# Patient Record
Sex: MALE | Race: OTHER
[De-identification: names, ages, dates, MRNs, and addresses within clinical notes are randomized per-mention and may not be internally consistent; named-entity substitution may affect disease eponyms.]

---

## 2019-08-07 ENCOUNTER — HOSPITAL ENCOUNTER (EMERGENCY)
Dept: HOSPITAL 94 - ER | Age: 84
Discharge: HOME | End: 2019-08-07
Payer: MEDICARE

## 2019-08-07 VITALS — SYSTOLIC BLOOD PRESSURE: 162 MMHG | DIASTOLIC BLOOD PRESSURE: 70 MMHG

## 2019-08-07 VITALS — HEIGHT: 65 IN | WEIGHT: 150.31 LBS | BODY MASS INDEX: 25.04 KG/M2

## 2019-08-07 DIAGNOSIS — Z79.899: ICD-10-CM

## 2019-08-07 DIAGNOSIS — Z79.4: ICD-10-CM

## 2019-08-07 DIAGNOSIS — I25.10: ICD-10-CM

## 2019-08-07 DIAGNOSIS — Z95.1: ICD-10-CM

## 2019-08-07 DIAGNOSIS — G89.29: ICD-10-CM

## 2019-08-07 DIAGNOSIS — I25.2: ICD-10-CM

## 2019-08-07 DIAGNOSIS — I10: ICD-10-CM

## 2019-08-07 DIAGNOSIS — R42: Primary | ICD-10-CM

## 2019-08-07 DIAGNOSIS — E78.00: ICD-10-CM

## 2019-08-07 DIAGNOSIS — Z91.018: ICD-10-CM

## 2019-08-07 LAB
ALBUMIN SERPL BCP-MCNC: 3.2 G/DL (ref 3.4–5)
ALBUMIN/GLOB SERPL: 0.8 {RATIO} (ref 1.1–1.5)
ALP SERPL-CCNC: 97 IU/L (ref 46–116)
ALT SERPL W P-5'-P-CCNC: 25 U/L (ref 12–78)
ANION GAP SERPL CALCULATED.3IONS-SCNC: 5 MMOL/L (ref 8–16)
AST SERPL W P-5'-P-CCNC: 14 U/L (ref 10–37)
BASOPHILS # BLD AUTO: 0 X10'3 (ref 0–0.2)
BASOPHILS NFR BLD AUTO: 0.5 % (ref 0–1)
BILIRUB SERPL-MCNC: 0.4 MG/DL (ref 0.1–1)
BUN SERPL-MCNC: 18 MG/DL (ref 7–18)
BUN/CREAT SERPL: 20.7 (ref 5.4–32)
CALCIUM SERPL-MCNC: 9 MG/DL (ref 8.5–10.1)
CHLORIDE SERPL-SCNC: 99 MMOL/L (ref 99–107)
CO2 SERPL-SCNC: 28.7 MMOL/L (ref 24–32)
CREAT SERPL-MCNC: 0.87 MG/DL (ref 0.6–1.1)
EOSINOPHIL # BLD AUTO: 0.2 X10'3 (ref 0–0.9)
EOSINOPHIL NFR BLD AUTO: 2.6 % (ref 0–6)
ERYTHROCYTE [DISTWIDTH] IN BLOOD BY AUTOMATED COUNT: 14.1 % (ref 11.5–14.5)
GFR SERPL CREATININE-BSD FRML MDRD: 84 ML/MIN
GLUCOSE SERPL-MCNC: 211 MG/DL (ref 70–104)
HCT VFR BLD AUTO: 38.2 % (ref 42–52)
HGB BLD-MCNC: 12.5 G/DL (ref 14–17.9)
LYMPHOCYTES # BLD AUTO: 1.5 X10'3 (ref 1.1–4.8)
LYMPHOCYTES NFR BLD AUTO: 19.9 % (ref 21–51)
MCH RBC QN AUTO: 28.6 PG (ref 27–31)
MCHC RBC AUTO-ENTMCNC: 32.8 G/DL (ref 33–36.5)
MCV RBC AUTO: 87.4 FL (ref 78–98)
MONOCYTES # BLD AUTO: 0.9 X10'3 (ref 0–0.9)
MONOCYTES NFR BLD AUTO: 12 % (ref 2–12)
NEUTROPHILS # BLD AUTO: 4.9 X10'3 (ref 1.8–7.7)
NEUTROPHILS NFR BLD AUTO: 65 % (ref 42–75)
PLATELET # BLD AUTO: 217 X10'3 (ref 140–440)
PMV BLD AUTO: 8.6 FL (ref 7.4–10.4)
POTASSIUM SERPL-SCNC: 4.3 MMOL/L (ref 3.5–5.1)
PROT SERPL-MCNC: 7 G/DL (ref 6.4–8.2)
RBC # BLD AUTO: 4.38 X10'6 (ref 4.7–6.1)
SODIUM SERPL-SCNC: 133 MMOL/L (ref 135–145)
WBC # BLD AUTO: 7.5 X10'3 (ref 4.5–11)

## 2019-08-07 PROCEDURE — 84484 ASSAY OF TROPONIN QUANT: CPT

## 2019-08-07 PROCEDURE — 85025 COMPLETE CBC W/AUTO DIFF WBC: CPT

## 2019-08-07 PROCEDURE — 85610 PROTHROMBIN TIME: CPT

## 2019-08-07 PROCEDURE — 36415 COLL VENOUS BLD VENIPUNCTURE: CPT

## 2019-08-07 PROCEDURE — 71045 X-RAY EXAM CHEST 1 VIEW: CPT

## 2019-08-07 PROCEDURE — 99284 EMERGENCY DEPT VISIT MOD MDM: CPT

## 2019-08-07 PROCEDURE — 80053 COMPREHEN METABOLIC PANEL: CPT

## 2019-08-07 PROCEDURE — 96360 HYDRATION IV INFUSION INIT: CPT

## 2019-08-07 PROCEDURE — 93005 ELECTROCARDIOGRAM TRACING: CPT

## 2019-08-07 PROCEDURE — 83880 ASSAY OF NATRIURETIC PEPTIDE: CPT

## 2019-08-07 NOTE — NUR
attempted gait test on pt. Pt refused to attempt gait test. Stated he was too 
hungry and tired to try to walk. Pt stated if we gave him a snack and let him 
take a nap he would try to walk after he woke up from his nap. Provider Octavia 
notified

## 2019-09-09 ENCOUNTER — HOSPITAL ENCOUNTER (EMERGENCY)
Dept: HOSPITAL 94 - ER | Age: 84
Discharge: HOME | End: 2019-09-09
Payer: MEDICARE

## 2019-09-09 VITALS — BODY MASS INDEX: 26.71 KG/M2 | WEIGHT: 160.34 LBS | HEIGHT: 65 IN

## 2019-09-09 VITALS — SYSTOLIC BLOOD PRESSURE: 145 MMHG | DIASTOLIC BLOOD PRESSURE: 75 MMHG

## 2019-09-09 DIAGNOSIS — E78.00: ICD-10-CM

## 2019-09-09 DIAGNOSIS — E07.9: ICD-10-CM

## 2019-09-09 DIAGNOSIS — G89.29: ICD-10-CM

## 2019-09-09 DIAGNOSIS — Z79.4: ICD-10-CM

## 2019-09-09 DIAGNOSIS — R42: ICD-10-CM

## 2019-09-09 DIAGNOSIS — D72.829: Primary | ICD-10-CM

## 2019-09-09 DIAGNOSIS — I10: ICD-10-CM

## 2019-09-09 DIAGNOSIS — Z95.1: ICD-10-CM

## 2019-09-09 DIAGNOSIS — N40.0: ICD-10-CM

## 2019-09-09 DIAGNOSIS — Z91.013: ICD-10-CM

## 2019-09-09 DIAGNOSIS — E11.9: ICD-10-CM

## 2019-09-09 DIAGNOSIS — I25.10: ICD-10-CM

## 2019-09-09 DIAGNOSIS — I25.2: ICD-10-CM

## 2019-09-09 LAB
ALBUMIN SERPL BCP-MCNC: 3 G/DL (ref 3.4–5)
ALBUMIN/GLOB SERPL: 0.8 {RATIO} (ref 1.1–1.5)
ALP SERPL-CCNC: 85 IU/L (ref 46–116)
ALT SERPL W P-5'-P-CCNC: 21 U/L (ref 12–78)
ANION GAP SERPL CALCULATED.3IONS-SCNC: 7 MMOL/L (ref 8–16)
AST SERPL W P-5'-P-CCNC: 15 U/L (ref 10–37)
BACTERIA URNS QL MICRO: (no result) /HPF
BASOPHILS # BLD AUTO: 0 X10'3 (ref 0–0.2)
BASOPHILS NFR BLD AUTO: 0.2 % (ref 0–1)
BILIRUB SERPL-MCNC: 0.5 MG/DL (ref 0.1–1)
BUN SERPL-MCNC: 15 MG/DL (ref 7–18)
BUN/CREAT SERPL: 19.2 (ref 5.4–32)
CALCIUM SERPL-MCNC: 9 MG/DL (ref 8.5–10.1)
CHLORIDE SERPL-SCNC: 98 MMOL/L (ref 99–107)
CLARITY UR: CLEAR
CO2 SERPL-SCNC: 26.1 MMOL/L (ref 24–32)
COLOR UR: YELLOW
CREAT SERPL-MCNC: 0.78 MG/DL (ref 0.6–1.1)
DEPRECATED SQUAMOUS URNS QL MICRO: (no result) /LPF
EOSINOPHIL # BLD AUTO: 0 X10'3 (ref 0–0.9)
EOSINOPHIL NFR BLD AUTO: 0.2 % (ref 0–6)
ERYTHROCYTE [DISTWIDTH] IN BLOOD BY AUTOMATED COUNT: 14.2 % (ref 11.5–14.5)
GFR SERPL CREATININE-BSD FRML MDRD: > 90 ML/MIN
GLUCOSE SERPL-MCNC: 221 MG/DL (ref 70–104)
GLUCOSE UR STRIP-MCNC: 250 MG/DL
HCT VFR BLD AUTO: 35.3 % (ref 42–52)
HGB BLD-MCNC: 11.6 G/DL (ref 14–17.9)
HGB UR QL STRIP: (no result)
KETONES UR STRIP-MCNC: (no result) MG/DL
LEUKOCYTE ESTERASE UR QL STRIP: NEGATIVE
LYMPHOCYTES # BLD AUTO: 1 X10'3 (ref 1.1–4.8)
LYMPHOCYTES NFR BLD AUTO: 6.7 % (ref 21–51)
MAGNESIUM SERPL-MCNC: 1.6 MG/DL (ref 1.5–2.4)
MCH RBC QN AUTO: 28.9 PG (ref 27–31)
MCHC RBC AUTO-ENTMCNC: 32.9 G/DL (ref 33–36.5)
MCV RBC AUTO: 87.8 FL (ref 78–98)
MONOCYTES # BLD AUTO: 1.8 X10'3 (ref 0–0.9)
MONOCYTES NFR BLD AUTO: 12.2 % (ref 2–12)
NEUTROPHILS # BLD AUTO: 11.7 X10'3 (ref 1.8–7.7)
NEUTROPHILS NFR BLD AUTO: 80.7 % (ref 42–75)
NITRITE UR QL STRIP: NEGATIVE
PH UR STRIP: 6 [PH] (ref 4.8–8)
PLATELET # BLD AUTO: 182 X10'3 (ref 140–440)
PMV BLD AUTO: 9.2 FL (ref 7.4–10.4)
POTASSIUM SERPL-SCNC: 4.2 MMOL/L (ref 3.5–5.1)
PROT SERPL-MCNC: 7 G/DL (ref 6.4–8.2)
PROT UR QL STRIP: (no result) MG/DL
RBC # BLD AUTO: 4.02 X10'6 (ref 4.7–6.1)
RBC #/AREA URNS HPF: (no result) /HPF (ref 0–2)
SODIUM SERPL-SCNC: 131 MMOL/L (ref 135–145)
SP GR UR STRIP: <=1.005 (ref 1–1.03)
URN COLLECT METHOD CLASS: (no result)
UROBILINOGEN UR STRIP-MCNC: 0.2 E.U/DL (ref 0.2–1)
WBC # BLD AUTO: 14.5 X10'3 (ref 4.5–11)
WBC #/AREA URNS HPF: (no result) /HPF (ref 0–4)

## 2019-09-09 PROCEDURE — 96361 HYDRATE IV INFUSION ADD-ON: CPT

## 2019-09-09 PROCEDURE — 80053 COMPREHEN METABOLIC PANEL: CPT

## 2019-09-09 PROCEDURE — 36415 COLL VENOUS BLD VENIPUNCTURE: CPT

## 2019-09-09 PROCEDURE — 84145 PROCALCITONIN (PCT): CPT

## 2019-09-09 PROCEDURE — 71045 X-RAY EXAM CHEST 1 VIEW: CPT

## 2019-09-09 PROCEDURE — 93005 ELECTROCARDIOGRAM TRACING: CPT

## 2019-09-09 PROCEDURE — 96374 THER/PROPH/DIAG INJ IV PUSH: CPT

## 2019-09-09 PROCEDURE — 85025 COMPLETE CBC W/AUTO DIFF WBC: CPT

## 2019-09-09 PROCEDURE — 81001 URINALYSIS AUTO W/SCOPE: CPT

## 2019-09-09 PROCEDURE — 99284 EMERGENCY DEPT VISIT MOD MDM: CPT

## 2019-09-09 PROCEDURE — 83735 ASSAY OF MAGNESIUM: CPT

## 2019-09-09 PROCEDURE — 84484 ASSAY OF TROPONIN QUANT: CPT

## 2019-09-10 ENCOUNTER — HOSPITAL ENCOUNTER (INPATIENT)
Dept: HOSPITAL 94 - ER | Age: 84
LOS: 8 days | Discharge: NURSING, REHAB OR LONG-TERM CARE | DRG: 871 | End: 2019-09-18
Attending: FAMILY MEDICINE | Admitting: HOSPITALIST
Payer: MEDICARE

## 2019-09-10 VITALS — SYSTOLIC BLOOD PRESSURE: 165 MMHG | DIASTOLIC BLOOD PRESSURE: 82 MMHG

## 2019-09-10 VITALS — SYSTOLIC BLOOD PRESSURE: 151 MMHG | DIASTOLIC BLOOD PRESSURE: 84 MMHG

## 2019-09-10 VITALS — BODY MASS INDEX: 27.18 KG/M2 | HEIGHT: 65 IN | WEIGHT: 163.14 LBS

## 2019-09-10 DIAGNOSIS — E86.0: ICD-10-CM

## 2019-09-10 DIAGNOSIS — N35.919: ICD-10-CM

## 2019-09-10 DIAGNOSIS — N13.9: ICD-10-CM

## 2019-09-10 DIAGNOSIS — E03.9: ICD-10-CM

## 2019-09-10 DIAGNOSIS — I25.10: ICD-10-CM

## 2019-09-10 DIAGNOSIS — I25.2: ICD-10-CM

## 2019-09-10 DIAGNOSIS — Z91.018: ICD-10-CM

## 2019-09-10 DIAGNOSIS — E11.40: ICD-10-CM

## 2019-09-10 DIAGNOSIS — Z79.899: ICD-10-CM

## 2019-09-10 DIAGNOSIS — D62: ICD-10-CM

## 2019-09-10 DIAGNOSIS — A41.9: Primary | ICD-10-CM

## 2019-09-10 DIAGNOSIS — E11.319: ICD-10-CM

## 2019-09-10 DIAGNOSIS — Z79.82: ICD-10-CM

## 2019-09-10 DIAGNOSIS — E11.22: ICD-10-CM

## 2019-09-10 DIAGNOSIS — N18.9: ICD-10-CM

## 2019-09-10 DIAGNOSIS — G89.29: ICD-10-CM

## 2019-09-10 DIAGNOSIS — N40.1: ICD-10-CM

## 2019-09-10 DIAGNOSIS — K80.00: ICD-10-CM

## 2019-09-10 DIAGNOSIS — E87.6: ICD-10-CM

## 2019-09-10 DIAGNOSIS — I13.0: ICD-10-CM

## 2019-09-10 DIAGNOSIS — N17.9: ICD-10-CM

## 2019-09-10 DIAGNOSIS — I21.4: ICD-10-CM

## 2019-09-10 DIAGNOSIS — Z86.73: ICD-10-CM

## 2019-09-10 DIAGNOSIS — I50.9: ICD-10-CM

## 2019-09-10 DIAGNOSIS — Z79.4: ICD-10-CM

## 2019-09-10 DIAGNOSIS — R33.8: ICD-10-CM

## 2019-09-10 DIAGNOSIS — Z85.46: ICD-10-CM

## 2019-09-10 DIAGNOSIS — Z74.01: ICD-10-CM

## 2019-09-10 DIAGNOSIS — M54.9: ICD-10-CM

## 2019-09-10 DIAGNOSIS — E78.5: ICD-10-CM

## 2019-09-10 DIAGNOSIS — G93.40: ICD-10-CM

## 2019-09-10 DIAGNOSIS — R31.0: ICD-10-CM

## 2019-09-10 DIAGNOSIS — E78.00: ICD-10-CM

## 2019-09-10 DIAGNOSIS — Z95.1: ICD-10-CM

## 2019-09-10 DIAGNOSIS — I95.9: ICD-10-CM

## 2019-09-10 LAB
ALBUMIN SERPL BCP-MCNC: 2.8 G/DL (ref 3.4–5)
ALBUMIN/GLOB SERPL: 0.6 {RATIO} (ref 1.1–1.5)
ALP SERPL-CCNC: 92 IU/L (ref 46–116)
ALT SERPL W P-5'-P-CCNC: 39 U/L (ref 12–78)
ANION GAP SERPL CALCULATED.3IONS-SCNC: 9 MMOL/L (ref 8–16)
AST SERPL W P-5'-P-CCNC: 25 U/L (ref 10–37)
BACTERIA URNS QL MICRO: (no result) /HPF
BASOPHILS # BLD AUTO: 0 X10'3 (ref 0–0.2)
BASOPHILS NFR BLD AUTO: 0.1 % (ref 0–1)
BILIRUB SERPL-MCNC: 0.7 MG/DL (ref 0.1–1)
BUN SERPL-MCNC: 15 MG/DL (ref 7–18)
BUN/CREAT SERPL: 15.8 (ref 5.4–32)
CALCIUM SERPL-MCNC: 9.2 MG/DL (ref 8.5–10.1)
CHLORIDE SERPL-SCNC: 98 MMOL/L (ref 99–107)
CLARITY UR: (no result)
CO2 SERPL-SCNC: 25.3 MMOL/L (ref 24–32)
COLOR UR: (no result)
CREAT SERPL-MCNC: 0.95 MG/DL (ref 0.6–1.1)
DEPRECATED SQUAMOUS URNS QL MICRO: (no result) /LPF
EOSINOPHIL # BLD AUTO: 0 X10'3 (ref 0–0.9)
EOSINOPHIL NFR BLD AUTO: 0 % (ref 0–6)
ERYTHROCYTE [DISTWIDTH] IN BLOOD BY AUTOMATED COUNT: 14.3 % (ref 11.5–14.5)
GFR SERPL CREATININE-BSD FRML MDRD: 76 ML/MIN
GLUCOSE SERPL-MCNC: 227 MG/DL (ref 70–104)
GLUCOSE UR STRIP-MCNC: 500 MG/DL
HBA1C MFR BLD: 7.6 % (ref 4.5–6.2)
HCT VFR BLD AUTO: 40.6 % (ref 42–52)
HGB BLD-MCNC: 13.4 G/DL (ref 14–17.9)
HGB UR QL STRIP: (no result)
KETONES UR STRIP-MCNC: 15 MG/DL
LEUKOCYTE ESTERASE UR QL STRIP: NEGATIVE
LYMPHOCYTES # BLD AUTO: 0.8 X10'3 (ref 1.1–4.8)
LYMPHOCYTES NFR BLD AUTO: 4.2 % (ref 21–51)
MCH RBC QN AUTO: 28.7 PG (ref 27–31)
MCHC RBC AUTO-ENTMCNC: 32.9 G/DL (ref 33–36.5)
MCV RBC AUTO: 87.1 FL (ref 78–98)
MONOCYTES # BLD AUTO: 1.8 X10'3 (ref 0–0.9)
MONOCYTES NFR BLD AUTO: 9.8 % (ref 2–12)
MUCOUS THREADS URNS QL MICRO: (no result) /LPF
NEUTROPHILS # BLD AUTO: 16 X10'3 (ref 1.8–7.7)
NEUTROPHILS NFR BLD AUTO: 85.9 % (ref 42–75)
NITRITE UR QL STRIP: NEGATIVE
PH UR STRIP: 6 [PH] (ref 4.8–8)
PLATELET # BLD AUTO: 223 X10'3 (ref 140–440)
PMV BLD AUTO: 9.1 FL (ref 7.4–10.4)
POTASSIUM SERPL-SCNC: 4.3 MMOL/L (ref 3.5–5.1)
PROT SERPL-MCNC: 7.6 G/DL (ref 6.4–8.2)
PROT UR QL STRIP: 100 MG/DL
RBC # BLD AUTO: 4.66 X10'6 (ref 4.7–6.1)
RBC #/AREA URNS HPF: (no result) /HPF (ref 0–2)
SODIUM SERPL-SCNC: 132 MMOL/L (ref 135–145)
SP GR UR STRIP: <=1.005 (ref 1–1.03)
URN COLLECT METHOD CLASS: (no result)
UROBILINOGEN UR STRIP-MCNC: 0.2 E.U/DL (ref 0.2–1)
WBC # BLD AUTO: 18.6 X10'3 (ref 4.5–11)
WBC #/AREA URNS HPF: (no result) /HPF (ref 0–4)
WBC CLUMPS #/AREA URNS HPF: (no result) /HPF

## 2019-09-10 PROCEDURE — 92508 TX SP LANG VOICE COMM GROUP: CPT

## 2019-09-10 PROCEDURE — 74176 CT ABD & PELVIS W/O CONTRAST: CPT

## 2019-09-10 PROCEDURE — 80048 BASIC METABOLIC PNL TOTAL CA: CPT

## 2019-09-10 PROCEDURE — 95816 EEG AWAKE AND DROWSY: CPT

## 2019-09-10 PROCEDURE — 97116 GAIT TRAINING THERAPY: CPT

## 2019-09-10 PROCEDURE — 80076 HEPATIC FUNCTION PANEL: CPT

## 2019-09-10 PROCEDURE — 71250 CT THORAX DX C-: CPT

## 2019-09-10 PROCEDURE — 82140 ASSAY OF AMMONIA: CPT

## 2019-09-10 PROCEDURE — 80053 COMPREHEN METABOLIC PANEL: CPT

## 2019-09-10 PROCEDURE — 87102 FUNGUS ISOLATION CULTURE: CPT

## 2019-09-10 PROCEDURE — 96361 HYDRATE IV INFUSION ADD-ON: CPT

## 2019-09-10 PROCEDURE — 87088 URINE BACTERIA CULTURE: CPT

## 2019-09-10 PROCEDURE — 36600 WITHDRAWAL OF ARTERIAL BLOOD: CPT

## 2019-09-10 PROCEDURE — 87075 CULTR BACTERIA EXCEPT BLOOD: CPT

## 2019-09-10 PROCEDURE — 83036 HEMOGLOBIN GLYCOSYLATED A1C: CPT

## 2019-09-10 PROCEDURE — 99152 MOD SED SAME PHYS/QHP 5/>YRS: CPT

## 2019-09-10 PROCEDURE — 71045 X-RAY EXAM CHEST 1 VIEW: CPT

## 2019-09-10 PROCEDURE — 76937 US GUIDE VASCULAR ACCESS: CPT

## 2019-09-10 PROCEDURE — 85730 THROMBOPLASTIN TIME PARTIAL: CPT

## 2019-09-10 PROCEDURE — 84145 PROCALCITONIN (PCT): CPT

## 2019-09-10 PROCEDURE — 70551 MRI BRAIN STEM W/O DYE: CPT

## 2019-09-10 PROCEDURE — 82948 REAGENT STRIP/BLOOD GLUCOSE: CPT

## 2019-09-10 PROCEDURE — 83735 ASSAY OF MAGNESIUM: CPT

## 2019-09-10 PROCEDURE — 93306 TTE W/DOPPLER COMPLETE: CPT

## 2019-09-10 PROCEDURE — 97112 NEUROMUSCULAR REEDUCATION: CPT

## 2019-09-10 PROCEDURE — 93005 ELECTROCARDIOGRAM TRACING: CPT

## 2019-09-10 PROCEDURE — 74177 CT ABD & PELVIS W/CONTRAST: CPT

## 2019-09-10 PROCEDURE — 87070 CULTURE OTHR SPECIMN AEROBIC: CPT

## 2019-09-10 PROCEDURE — 99285 EMERGENCY DEPT VISIT HI MDM: CPT

## 2019-09-10 PROCEDURE — 70450 CT HEAD/BRAIN W/O DYE: CPT

## 2019-09-10 PROCEDURE — 83880 ASSAY OF NATRIURETIC PEPTIDE: CPT

## 2019-09-10 PROCEDURE — 80061 LIPID PANEL: CPT

## 2019-09-10 PROCEDURE — 96365 THER/PROPH/DIAG IV INF INIT: CPT

## 2019-09-10 PROCEDURE — 92616 FEES W/LARYNGEAL SENSE TEST: CPT

## 2019-09-10 PROCEDURE — 82803 BLOOD GASES ANY COMBINATION: CPT

## 2019-09-10 PROCEDURE — 84132 ASSAY OF SERUM POTASSIUM: CPT

## 2019-09-10 PROCEDURE — 93880 EXTRACRANIAL BILAT STUDY: CPT

## 2019-09-10 PROCEDURE — 76775 US EXAM ABDO BACK WALL LIM: CPT

## 2019-09-10 PROCEDURE — 83605 ASSAY OF LACTIC ACID: CPT

## 2019-09-10 PROCEDURE — 97110 THERAPEUTIC EXERCISES: CPT

## 2019-09-10 PROCEDURE — 96375 TX/PRO/DX INJ NEW DRUG ADDON: CPT

## 2019-09-10 PROCEDURE — 36415 COLL VENOUS BLD VENIPUNCTURE: CPT

## 2019-09-10 PROCEDURE — 85018 HEMOGLOBIN: CPT

## 2019-09-10 PROCEDURE — 97530 THERAPEUTIC ACTIVITIES: CPT

## 2019-09-10 PROCEDURE — 87040 BLOOD CULTURE FOR BACTERIA: CPT

## 2019-09-10 PROCEDURE — 84484 ASSAY OF TROPONIN QUANT: CPT

## 2019-09-10 PROCEDURE — 87186 SC STD MICRODIL/AGAR DIL: CPT

## 2019-09-10 PROCEDURE — 87077 CULTURE AEROBIC IDENTIFY: CPT

## 2019-09-10 PROCEDURE — 87081 CULTURE SCREEN ONLY: CPT

## 2019-09-10 PROCEDURE — 47532 INJECTION FOR CHOLANGIOGRAM: CPT

## 2019-09-10 PROCEDURE — 85025 COMPLETE CBC W/AUTO DIFF WBC: CPT

## 2019-09-10 PROCEDURE — 84443 ASSAY THYROID STIM HORMONE: CPT

## 2019-09-10 PROCEDURE — 81001 URINALYSIS AUTO W/SCOPE: CPT

## 2019-09-10 PROCEDURE — 99153 MOD SED SAME PHYS/QHP EA: CPT

## 2019-09-10 PROCEDURE — 97163 PT EVAL HIGH COMPLEX 45 MIN: CPT

## 2019-09-10 RX ADMIN — INSULIN GLARGINE SCH UNIT: 100 INJECTION, SOLUTION SUBCUTANEOUS at 21:00

## 2019-09-10 RX ADMIN — TAZOBACTAM SODIUM AND PIPERACILLIN SODIUM SCH MLS/HR: 500; 4 INJECTION, SOLUTION INTRAVENOUS at 19:16

## 2019-09-10 RX ADMIN — SODIUM CHLORIDE SCH MLS/HR: 9 INJECTION INTRAMUSCULAR; INTRAVENOUS; SUBCUTANEOUS at 18:08

## 2019-09-10 NOTE — NUR
Bradford Cath

Attempted to put a Bradford in but would not go through, stopped the procedure when patient 
complained of pain. Placed a condom-cath instead. MD Felix was notified, Bradford order 
stopped, and will continue to monitor patient.

## 2019-09-10 NOTE — NUR
Patient in room PCU 3022. I have received report from Valeria GONZALEZ from ER on telephone, had the 
opportunity to ask questions and assume patient care. Patient is in bed, comfortable, and 
has stable vital signs; will continue to monitor.

## 2019-09-11 VITALS — DIASTOLIC BLOOD PRESSURE: 100 MMHG | SYSTOLIC BLOOD PRESSURE: 187 MMHG

## 2019-09-11 VITALS — DIASTOLIC BLOOD PRESSURE: 93 MMHG | SYSTOLIC BLOOD PRESSURE: 167 MMHG

## 2019-09-11 VITALS — DIASTOLIC BLOOD PRESSURE: 98 MMHG | SYSTOLIC BLOOD PRESSURE: 160 MMHG

## 2019-09-11 VITALS — SYSTOLIC BLOOD PRESSURE: 172 MMHG | DIASTOLIC BLOOD PRESSURE: 100 MMHG

## 2019-09-11 VITALS — SYSTOLIC BLOOD PRESSURE: 123 MMHG | DIASTOLIC BLOOD PRESSURE: 63 MMHG

## 2019-09-11 VITALS — DIASTOLIC BLOOD PRESSURE: 80 MMHG | SYSTOLIC BLOOD PRESSURE: 133 MMHG

## 2019-09-11 VITALS — SYSTOLIC BLOOD PRESSURE: 170 MMHG | DIASTOLIC BLOOD PRESSURE: 90 MMHG

## 2019-09-11 VITALS — DIASTOLIC BLOOD PRESSURE: 87 MMHG | SYSTOLIC BLOOD PRESSURE: 176 MMHG

## 2019-09-11 VITALS — SYSTOLIC BLOOD PRESSURE: 131 MMHG | DIASTOLIC BLOOD PRESSURE: 78 MMHG

## 2019-09-11 VITALS — DIASTOLIC BLOOD PRESSURE: 54 MMHG | SYSTOLIC BLOOD PRESSURE: 93 MMHG

## 2019-09-11 VITALS — SYSTOLIC BLOOD PRESSURE: 173 MMHG | DIASTOLIC BLOOD PRESSURE: 89 MMHG

## 2019-09-11 VITALS — SYSTOLIC BLOOD PRESSURE: 163 MMHG | DIASTOLIC BLOOD PRESSURE: 90 MMHG

## 2019-09-11 LAB
ALBUMIN SERPL BCP-MCNC: 2.2 G/DL (ref 3.4–5)
ANION GAP SERPL CALCULATED.3IONS-SCNC: 10 MMOL/L (ref 8–16)
BASOPHILS # BLD AUTO: 0 X10'3 (ref 0–0.2)
BASOPHILS # BLD AUTO: 0 X10'3 (ref 0–0.2)
BASOPHILS NFR BLD AUTO: 0 % (ref 0–1)
BASOPHILS NFR BLD AUTO: 0.1 % (ref 0–1)
BUN SERPL-MCNC: 15 MG/DL (ref 7–18)
BUN/CREAT SERPL: 16.3 (ref 5.4–32)
CALCIUM SERPL-MCNC: 9.2 MG/DL (ref 8.5–10.1)
CHLORIDE SERPL-SCNC: 103 MMOL/L (ref 99–107)
CHOLEST SERPL-MCNC: 101 MG/DL (ref 0–200)
CHOLEST/HDLC SERPL: 1.8 {RATIO} (ref 0–4.99)
CO2 SERPL-SCNC: 22.6 MMOL/L (ref 24–32)
CREAT SERPL-MCNC: 0.92 MG/DL (ref 0.6–1.1)
EOSINOPHIL # BLD AUTO: 0 X10'3 (ref 0–0.9)
EOSINOPHIL # BLD AUTO: 0 X10'3 (ref 0–0.9)
EOSINOPHIL NFR BLD AUTO: 0 % (ref 0–6)
EOSINOPHIL NFR BLD AUTO: 0 % (ref 0–6)
ERYTHROCYTE [DISTWIDTH] IN BLOOD BY AUTOMATED COUNT: 14.2 % (ref 11.5–14.5)
ERYTHROCYTE [DISTWIDTH] IN BLOOD BY AUTOMATED COUNT: 14.6 % (ref 11.5–14.5)
GFR SERPL CREATININE-BSD FRML MDRD: 78 ML/MIN
GLUCOSE SERPL-MCNC: 251 MG/DL (ref 70–104)
HCT VFR BLD AUTO: 38 % (ref 42–52)
HCT VFR BLD AUTO: 44.1 % (ref 42–52)
HDLC SERPL-MCNC: 57 MG/DL (ref 35–60)
HGB BLD-MCNC: 12.7 G/DL (ref 14–17.9)
HGB BLD-MCNC: 14.4 G/DL (ref 14–17.9)
LDLC SERPL DIRECT ASSAY-MCNC: 36 MG/DL (ref 50–100)
LYMPHOCYTES # BLD AUTO: 0.9 X10'3 (ref 1.1–4.8)
LYMPHOCYTES # BLD AUTO: 0.9 X10'3 (ref 1.1–4.8)
LYMPHOCYTES NFR BLD AUTO: 4 % (ref 21–51)
LYMPHOCYTES NFR BLD AUTO: 4.1 % (ref 21–51)
LYMPHOCYTES NFR BLD MANUAL: 8 % (ref 21–51)
MCH RBC QN AUTO: 28.8 PG (ref 27–31)
MCH RBC QN AUTO: 29.2 PG (ref 27–31)
MCHC RBC AUTO-ENTMCNC: 32.7 G/DL (ref 33–36.5)
MCHC RBC AUTO-ENTMCNC: 33.4 G/DL (ref 33–36.5)
MCV RBC AUTO: 87.4 FL (ref 78–98)
MCV RBC AUTO: 88.1 FL (ref 78–98)
MONOCYTES # BLD AUTO: 2.4 X10'3 (ref 0–0.9)
MONOCYTES # BLD AUTO: 2.6 X10'3 (ref 0–0.9)
MONOCYTES NFR BLD AUTO: 10.8 % (ref 2–12)
MONOCYTES NFR BLD AUTO: 11.5 % (ref 2–12)
MONOCYTES NFR BLD MANUAL: 10 % (ref 2–12)
NEUTROPHILS # BLD AUTO: 18.7 X10'3 (ref 1.8–7.7)
NEUTROPHILS # BLD AUTO: 19.1 X10'3 (ref 1.8–7.7)
NEUTROPHILS NFR BLD AUTO: 84.4 % (ref 42–75)
NEUTROPHILS NFR BLD AUTO: 85.1 % (ref 42–75)
NEUTS BAND # BLD MANUAL: 3 % (ref 0–10)
NEUTS SEG NFR BLD MANUAL: 79 % (ref 42–75)
PLATELET # BLD AUTO: 236 X10'3 (ref 140–440)
PLATELET # BLD AUTO: 253 X10'3 (ref 140–440)
PLATELET BLD QL SMEAR: NORMAL
PMV BLD AUTO: 9.4 FL (ref 7.4–10.4)
PMV BLD AUTO: 9.5 FL (ref 7.4–10.4)
POTASSIUM SERPL-SCNC: 3.1 MMOL/L (ref 3.5–5.1)
RBC # BLD AUTO: 4.35 X10'6 (ref 4.7–6.1)
RBC # BLD AUTO: 5 X10'6 (ref 4.7–6.1)
RBC MORPH BLD: NORMAL
SODIUM SERPL-SCNC: 136 MMOL/L (ref 135–145)
TOTAL CELLS COUNTED FLD: 100
TRIGL SERPL-MCNC: 31 MG/DL (ref 20–135)
WBC # BLD AUTO: 21.9 X10'3 (ref 4.5–11)
WBC # BLD AUTO: 22.7 X10'3 (ref 4.5–11)

## 2019-09-11 PROCEDURE — 0F9430Z DRAINAGE OF GALLBLADDER WITH DRAINAGE DEVICE, PERCUTANEOUS APPROACH: ICD-10-PCS | Performed by: RADIOLOGY

## 2019-09-11 RX ADMIN — TAZOBACTAM SODIUM AND PIPERACILLIN SODIUM SCH MLS/HR: 500; 4 INJECTION, SOLUTION INTRAVENOUS at 02:16

## 2019-09-11 RX ADMIN — INSULIN LISPRO SCH UNITS: 100 INJECTION, SOLUTION INTRAVENOUS; SUBCUTANEOUS at 19:11

## 2019-09-11 RX ADMIN — ENOXAPARIN SODIUM SCH MG: 100 INJECTION SUBCUTANEOUS at 20:12

## 2019-09-11 RX ADMIN — ENOXAPARIN SODIUM SCH MG: 100 INJECTION SUBCUTANEOUS at 20:18

## 2019-09-11 RX ADMIN — ENOXAPARIN SODIUM SCH MG: 100 INJECTION SUBCUTANEOUS at 12:49

## 2019-09-11 RX ADMIN — Medication SCH MG: at 12:46

## 2019-09-11 RX ADMIN — INSULIN LISPRO SCH UNITS: 100 INJECTION, SOLUTION INTRAVENOUS; SUBCUTANEOUS at 14:07

## 2019-09-11 RX ADMIN — SODIUM CHLORIDE SCH MLS/HR: 9 INJECTION INTRAMUSCULAR; INTRAVENOUS; SUBCUTANEOUS at 03:46

## 2019-09-11 RX ADMIN — Medication SCH MMU: at 20:08

## 2019-09-11 RX ADMIN — INSULIN GLARGINE SCH UNIT: 100 INJECTION, SOLUTION SUBCUTANEOUS at 21:43

## 2019-09-11 RX ADMIN — ENOXAPARIN SODIUM SCH MG: 100 INJECTION SUBCUTANEOUS at 20:10

## 2019-09-11 RX ADMIN — TAZOBACTAM SODIUM AND PIPERACILLIN SODIUM SCH MLS/HR: 500; 4 INJECTION, SOLUTION INTRAVENOUS at 20:07

## 2019-09-11 RX ADMIN — HYDROCODONE BITARTRATE AND ACETAMINOPHEN PRN TAB: 5; 325 TABLET ORAL at 07:32

## 2019-09-11 RX ADMIN — MULTIVITAMIN SCH ML: LIQUID ORAL at 13:44

## 2019-09-11 RX ADMIN — Medication SCH UNIT: at 09:32

## 2019-09-11 RX ADMIN — Medication SCH MCG: at 09:33

## 2019-09-11 RX ADMIN — LEVOTHYROXINE SODIUM SCH MCG: 75 TABLET ORAL at 09:34

## 2019-09-11 RX ADMIN — PANTOPRAZOLE SODIUM SCH MG: 40 TABLET, DELAYED RELEASE ORAL at 09:36

## 2019-09-11 RX ADMIN — ONDANSETRON PRN MG: 2 INJECTION, SOLUTION INTRAMUSCULAR; INTRAVENOUS at 02:42

## 2019-09-11 RX ADMIN — POLYETHYLENE GLYCOL 3350 SCH GM: 17 POWDER, FOR SOLUTION ORAL at 13:43

## 2019-09-11 RX ADMIN — TAZOBACTAM SODIUM AND PIPERACILLIN SODIUM SCH MLS/HR: 500; 4 INJECTION, SOLUTION INTRAVENOUS at 12:50

## 2019-09-11 RX ADMIN — INSULIN LISPRO SCH UNITS: 100 INJECTION, SOLUTION INTRAVENOUS; SUBCUTANEOUS at 07:22

## 2019-09-11 RX ADMIN — POTASSIUM CHLORIDE PRN MEQ: 1500 TABLET, EXTENDED RELEASE ORAL at 21:27

## 2019-09-11 RX ADMIN — POTASSIUM CHLORIDE PRN MEQ: 1500 TABLET, EXTENDED RELEASE ORAL at 17:19

## 2019-09-11 NOTE — NUR
Patient in room PCU 3022. I have received report from Meredith and had the opportunity to ask 
questions and assume patient care.

## 2019-09-11 NOTE — NUR
Orientee documentation:

I have reviewed and agree with all interventions, assessments performed and documented by 
LISA Cunha.

## 2019-09-11 NOTE — NUR
Dr. Pickard came to patient bedside and was able to flush the line and remove the clot, drain 
working well at this time.

## 2019-09-11 NOTE — NUR
DM Consult: A1C 7.6. Pt admit w/ acute cholecystitis pending cholecystostomy drain today per 
MD note; currently NPO. Per MD note pt is poor historian and has caregivers at home w/ SO. 
A1C remains between 6.8-7.6 past 2 years decent given age. Currently not appropriate for DM 
ed at this time. Written DM ed w/ RD contact information mailed to pt home address. 

-------------------------------------------------------------------------------

Addendum: 09/11/19 at 1211 by Zac Saldivar RD

-------------------------------------------------------------------------------

Amended: Links added.

## 2019-09-11 NOTE — NUR
PAGER ID:  [phone] 

MESSAGE:  LISA Briscoe ext [phone], Tano, daughter says she does not think he needs 
voltaren gel, will not bring from home, please DC medication

## 2019-09-11 NOTE — NUR
spoke with IR, and informed them of the drain issue and being unable to contact Dr. Pickard, 
they said they will attempt to contact him and have him call with orders.

## 2019-09-11 NOTE — NUR
Patient in room PCU 3022. I have received report from LISA Briscoe   and had the opportunity to 
ask questions and assume patient care. Pt is sleepy with no sign of distress. Wife is 
staying with him tonight. Will continue to monitor

## 2019-09-11 NOTE — NUR
Problems reprioritized. Patient report given, questions answered & plan of care reviewed 
with Rachna GONZALEZ.

## 2019-09-11 NOTE — NUR
High Blood pressure

170/90, , patient not symptomatic, MD Felix notified, no new orders given, will 
continue to monitor.

## 2019-09-11 NOTE — NUR
PAGER ID:  [phone] 

MESSAGE:  [id number] Oj Freedman Potassium 3.1 today, patient does not have potassium 
replacement orders, thank you Kamryn GONZALEZ, [phone]

## 2019-09-11 NOTE — NUR
Patient in room PCU 3022. I have received report from Angie and had the opportunity to 
ask questions and assume patient care.

## 2019-09-11 NOTE — NUR
Problems reprioritized. Patient report given, questions answered & plan of care reviewed 
with LISA Pimentel.  Patient currently resting in bed, wife at bedside, bed locked and low, call 
light in reach, stable at [facility] change

## 2019-09-11 NOTE — NUR
there is a clot clogging the drain tube, notified Dr. Pickard, he ordered to have the line 
flushed with 10ml of NS, flush was attempted but unable to remove clot/clog.  Reattempted to 
contact Dr. Pickard, went to voicemail and mailbox is full.  Will reattempt to notify.

## 2019-09-11 NOTE — NUR
Received call from Dr Martinez. He would like to know if cardiology is going to consult on 
the patient as his troponin was 8.63. There is no clear documentation stating that they have 
been consulted. Spoke with Ting Trinidad and she is not aware of the patient and states that 
Dr. Harden is on call. 



Paged Dr Rangel

"PAGER ID:  [phone] 

MESSAGE:  [id number] Lou [id number] Oj Freedman patient's 12 hour troponin last night was 8.63 
and it is unclear if cardiology has been consulted. Dr Martinez would like to know if cardio 
is going to consult."



Awaiting call back...

-------------------------------------------------------------------------------

Addendum: 09/11/19 at 0745 by Lou Monsivais RN

-------------------------------------------------------------------------------

Ting Trinidad states that ER's note states the patient sees Dr. Garcia. She is calling his office 
to find out if he is a current patient. 

-------------------------------------------------------------------------------

Addendum: 09/11/19 at 0811 by Lou Monsivais RN

-------------------------------------------------------------------------------

Ting Trinidad spoke with Dr Garcia and stated she will see the patient. Called Dr Martinez to 
inform him.

## 2019-09-12 VITALS — DIASTOLIC BLOOD PRESSURE: 54 MMHG | SYSTOLIC BLOOD PRESSURE: 118 MMHG

## 2019-09-12 VITALS — SYSTOLIC BLOOD PRESSURE: 134 MMHG | DIASTOLIC BLOOD PRESSURE: 76 MMHG

## 2019-09-12 VITALS — DIASTOLIC BLOOD PRESSURE: 56 MMHG | SYSTOLIC BLOOD PRESSURE: 94 MMHG

## 2019-09-12 VITALS — DIASTOLIC BLOOD PRESSURE: 61 MMHG | SYSTOLIC BLOOD PRESSURE: 105 MMHG

## 2019-09-12 VITALS — DIASTOLIC BLOOD PRESSURE: 45 MMHG | SYSTOLIC BLOOD PRESSURE: 82 MMHG

## 2019-09-12 VITALS — DIASTOLIC BLOOD PRESSURE: 54 MMHG | SYSTOLIC BLOOD PRESSURE: 88 MMHG

## 2019-09-12 VITALS — DIASTOLIC BLOOD PRESSURE: 66 MMHG | SYSTOLIC BLOOD PRESSURE: 126 MMHG

## 2019-09-12 LAB
ALBUMIN SERPL BCP-MCNC: 1.6 G/DL (ref 3.4–5)
ALBUMIN SERPL BCP-MCNC: 1.7 G/DL (ref 3.4–5)
ALBUMIN/GLOB SERPL: 0.4 {RATIO} (ref 1.1–1.5)
ALP SERPL-CCNC: 97 IU/L (ref 46–116)
ALT SERPL W P-5'-P-CCNC: 91 U/L (ref 12–78)
ANION GAP SERPL CALCULATED.3IONS-SCNC: 12 MMOL/L (ref 8–16)
ANISOCYTOSIS BLD QL SMEAR: (no result)
AST SERPL W P-5'-P-CCNC: 93 U/L (ref 10–37)
BASE EXCESS BLDA CALC-SCNC: -3.9 MMOL/L (ref -2–3)
BASOPHILS # BLD AUTO: 0 X10'3 (ref 0–0.2)
BASOPHILS NFR BLD AUTO: 0.1 % (ref 0–1)
BILIRUB DIRECT SERPL-MCNC: 0.4 MG/DL (ref 0–0.3)
BILIRUB SERPL-MCNC: 0.6 MG/DL (ref 0.1–1)
BODY TEMPERATURE: 37
BUN SERPL-MCNC: 32 MG/DL (ref 7–18)
BUN/CREAT SERPL: 17.8 (ref 5.4–32)
CALCIUM SERPL-MCNC: 8.4 MG/DL (ref 8.5–10.1)
CHLORIDE SERPL-SCNC: 109 MMOL/L (ref 99–107)
CO2 SERPL-SCNC: 20.4 MMOL/L (ref 24–32)
COHGB MFR BLDA: 0.3 % (ref 0.5–1.5)
CREAT SERPL-MCNC: 1.8 MG/DL (ref 0.6–1.1)
EOSINOPHIL # BLD AUTO: 0 X10'3 (ref 0–0.9)
EOSINOPHIL NFR BLD AUTO: 0 % (ref 0–6)
ERYTHROCYTE [DISTWIDTH] IN BLOOD BY AUTOMATED COUNT: 14.4 % (ref 11.5–14.5)
GFR SERPL CREATININE-BSD FRML MDRD: 36 ML/MIN
GLUCOSE SERPL-MCNC: 80 MG/DL (ref 70–104)
HCO3 BLDA-SCNC: 19.2 MMOL/L (ref 22–26)
HCT VFR BLD AUTO: 33.5 % (ref 42–52)
HGB BLD-MCNC: 11.1 G/DL (ref 14–17.9)
HGB BLDA-MCNC: 12.4 G/DL (ref 14–17.9)
INHALED O2 FLOW RATE: 3 L/MIN
LYMPHOCYTES # BLD AUTO: 1.4 X10'3 (ref 1.1–4.8)
LYMPHOCYTES NFR BLD AUTO: 8.8 % (ref 21–51)
LYMPHOCYTES NFR BLD MANUAL: 13 % (ref 21–51)
MAGNESIUM SERPL-MCNC: 2 MG/DL (ref 1.5–2.4)
MCH RBC QN AUTO: 29.1 PG (ref 27–31)
MCHC RBC AUTO-ENTMCNC: 33.1 G/DL (ref 33–36.5)
MCV RBC AUTO: 87.9 FL (ref 78–98)
METAMYELOCYTES NFR BLD MANUAL: 1 % (ref 0–0)
METHGB MFR BLDA: 0.1 % (ref 0.3–1.12)
MONOCYTES # BLD AUTO: 2.2 X10'3 (ref 0–0.9)
MONOCYTES NFR BLD AUTO: 13.6 % (ref 2–12)
MONOCYTES NFR BLD MANUAL: 9 % (ref 2–12)
NEUTROPHILS # BLD AUTO: 12.6 X10'3 (ref 1.8–7.7)
NEUTROPHILS NFR BLD AUTO: 77.5 % (ref 42–75)
NEUTS BAND # BLD MANUAL: 7 % (ref 0–10)
NEUTS SEG NFR BLD MANUAL: 70 % (ref 42–75)
O2/TOTAL GAS SETTING VFR VENT: 32 MMHG/%
OXYHGB MFR BLDA: 93.2 % (ref 94–100)
PCO2 TEMP ADJ BLDA: 29.5 MMHG (ref 35–45)
PH TEMP ADJ BLDA: 7.43 [PH] (ref 7.35–7.45)
PLATELET # BLD AUTO: 205 X10'3 (ref 140–440)
PLATELET BLD QL SMEAR: NORMAL
PMV BLD AUTO: 9.4 FL (ref 7.4–10.4)
PO2 TEMP ADJ BLD: 68.9 MMHG (ref 83–108)
POIKILOCYTOSIS BLD QL SMEAR: (no result)
POTASSIUM SERPL-SCNC: 3.4 MMOL/L (ref 3.5–5.1)
PROT SERPL-MCNC: 5.6 G/DL (ref 6.4–8.2)
RBC # BLD AUTO: 3.82 X10'6 (ref 4.7–6.1)
RBC MORPH BLD: (no result)
SAO2 % BLDA: 93.6 % (ref 95–98)
SODIUM SERPL-SCNC: 141 MMOL/L (ref 135–145)
TOTAL CELLS COUNTED FLD: 100
TOXIC GRANULES BLD QL SMEAR: (no result)
TROPONIN I SERPL-MCNC: 5.84 NG/ML (ref 0–0.05)
WBC # BLD AUTO: 16.3 X10'3 (ref 4.5–11)

## 2019-09-12 PROCEDURE — 4A10X4Z MONITORING OF CENTRAL NERVOUS ELECTRICAL ACTIVITY, EXTERNAL APPROACH: ICD-10-PCS

## 2019-09-12 RX ADMIN — Medication SCH UNIT: at 08:00

## 2019-09-12 RX ADMIN — Medication SCH MCG: at 08:00

## 2019-09-12 RX ADMIN — Medication SCH MMU: at 20:00

## 2019-09-12 RX ADMIN — PANTOPRAZOLE SODIUM SCH MG: 40 TABLET, DELAYED RELEASE ORAL at 08:00

## 2019-09-12 RX ADMIN — ENOXAPARIN SODIUM SCH MG: 100 INJECTION SUBCUTANEOUS at 10:57

## 2019-09-12 RX ADMIN — Medication SCH MG: at 08:30

## 2019-09-12 RX ADMIN — INSULIN GLARGINE SCH UNIT: 100 INJECTION, SOLUTION SUBCUTANEOUS at 21:00

## 2019-09-12 RX ADMIN — SODIUM CHLORIDE SCH MLS/HR: 9 INJECTION INTRAMUSCULAR; INTRAVENOUS; SUBCUTANEOUS at 20:02

## 2019-09-12 RX ADMIN — Medication SCH MMU: at 08:00

## 2019-09-12 RX ADMIN — POLYETHYLENE GLYCOL 3350 SCH GM: 17 POWDER, FOR SOLUTION ORAL at 08:00

## 2019-09-12 RX ADMIN — LEVOTHYROXINE SODIUM SCH MCG: 75 TABLET ORAL at 08:00

## 2019-09-12 RX ADMIN — MULTIVITAMIN SCH ML: LIQUID ORAL at 08:00

## 2019-09-12 RX ADMIN — POTASSIUM CHLORIDE PRN MEQ: 1500 TABLET, EXTENDED RELEASE ORAL at 01:07

## 2019-09-12 RX ADMIN — TAZOBACTAM SODIUM AND PIPERACILLIN SODIUM SCH MLS/HR: 500; 4 INJECTION, SOLUTION INTRAVENOUS at 20:01

## 2019-09-12 RX ADMIN — POTASSIUM CHLORIDE PRN MLS/HR: 7.46 INJECTION, SOLUTION INTRAVENOUS at 22:49

## 2019-09-12 RX ADMIN — POTASSIUM CHLORIDE PRN MLS/HR: 7.46 INJECTION, SOLUTION INTRAVENOUS at 20:02

## 2019-09-12 RX ADMIN — TAZOBACTAM SODIUM AND PIPERACILLIN SODIUM SCH MLS/HR: 500; 4 INJECTION, SOLUTION INTRAVENOUS at 04:13

## 2019-09-12 RX ADMIN — SODIUM CHLORIDE SCH MLS/HR: 9 INJECTION INTRAMUSCULAR; INTRAVENOUS; SUBCUTANEOUS at 04:14

## 2019-09-12 RX ADMIN — TAZOBACTAM SODIUM AND PIPERACILLIN SODIUM SCH MLS/HR: 500; 4 INJECTION, SOLUTION INTRAVENOUS at 12:30

## 2019-09-12 NOTE — NUR
Problems reprioritized. Patient report given, questions answered & plan of care reviewed 
with LISA Briscoe. Patient's blood pressure is stable, 3L of oxygen via NC, pt still sleeping.

## 2019-09-12 NOTE — NUR
PAGER ID:  [phone] 

MESSAGE:  LISA Briscoe ext [phone], [phone], Tano, Patient is unresponsive this morning, rapid was 
called last night d/t low BP, head CT negative, , VSS at this time, patient may have 
aspirated, CXR?, more labs? please advise.

## 2019-09-12 NOTE — NUR
Dr Rangel at bedside, ordered another 0.4mg narcan, if no response then we will move to brain 
MRI and possibly EGD

## 2019-09-12 NOTE — NUR
Patient in room PCU 3022. I have received report from  LISA Pimentel  and had the opportunity to 
ask questions and assume patient care.  Patient is unresponsive to anything except painful 
stimuli.  Patient started gurgling during report, suction was set up and patient's 
oropharynx suctioned with a yankhauer, gag reflex intact, but no eye opening, orange thick 
sputum suctioned, suspect aspiration, Dr. Felix called and stated to sit patient up a 
bit (patient is currently in semi-fowlers) and that his lack of response is likely related 
to medications but he does not need ICU at this time.  

fluids running at 50ml/hr per order, VSS, checked sugar: , will continue to monitor.

## 2019-09-12 NOTE — NUR
Patient in room [place] [id number]. I have received report from  Elif GONZALEZ  and had the opportunity to 
ask questions and assume patient care.

-------------------------------------------------------------------------------

Addendum: 09/12/19 at 0733 by Andres GUAJARDO [facility]

-------------------------------------------------------------------------------

Patient in room [place] [address]. I have received report from Rachna GONZALEZ   and had the opportunity to 
ask questions and assume patient care.

## 2019-09-12 NOTE — NUR
Problems reprioritized. Patient report given, questions answered & plan of care reviewed 
with LISA Martinez.  Patient currently resting in bed, bed locked and low, wife at bedside, vital 
signs stable at this time.

## 2019-09-12 NOTE — NUR
PAGER ID:  [phone] 

MESSAGE:  LISA Briscoe, ext [phone], [phone], Tano, patient still unresponsive after 0.4mg Narcan, 
did you want contrast or no contrast with MRI?

## 2019-09-12 NOTE — NUR
Rapid Response

A rapid response was called on this patient. On arrival to bedside, the patient's blood 
presure was 64/37 automatic and keep going down. Per protocol administered NS bolus. Called 
Dr. Felix and was advised to start a 1,000 NS at 999 ml/hr.   Pt's blood sugar was 90. 

Interventions:  Per Dr. Felix: NS 1000 at 999ml/hr, hold beta blocker. order CT Head

Rapid response outcome: Pt was taken to get the CT Head. Upon return I took his BP  101/46, 
SPO2 is 98%.  He is in 3L of oxygen via NC. Pt shows no sign of distress. Will continue to 
monitor

## 2019-09-12 NOTE — NUR
Patient in room PCU 3022. I have received report from  LISA Briscoe  and had the opportunity to 
ask questions and assume patient care.

## 2019-09-12 NOTE — NUR
Student documentation:

I have reviewed  interventions, assessments performed and documented by DAYANA ESCALANTE 
[facility].

## 2019-09-13 VITALS — DIASTOLIC BLOOD PRESSURE: 74 MMHG | SYSTOLIC BLOOD PRESSURE: 162 MMHG

## 2019-09-13 VITALS — DIASTOLIC BLOOD PRESSURE: 70 MMHG | SYSTOLIC BLOOD PRESSURE: 155 MMHG

## 2019-09-13 VITALS — SYSTOLIC BLOOD PRESSURE: 146 MMHG | DIASTOLIC BLOOD PRESSURE: 78 MMHG

## 2019-09-13 VITALS — DIASTOLIC BLOOD PRESSURE: 69 MMHG | SYSTOLIC BLOOD PRESSURE: 148 MMHG

## 2019-09-13 VITALS — SYSTOLIC BLOOD PRESSURE: 148 MMHG | DIASTOLIC BLOOD PRESSURE: 69 MMHG

## 2019-09-13 VITALS — SYSTOLIC BLOOD PRESSURE: 162 MMHG | DIASTOLIC BLOOD PRESSURE: 74 MMHG

## 2019-09-13 VITALS — DIASTOLIC BLOOD PRESSURE: 49 MMHG | SYSTOLIC BLOOD PRESSURE: 103 MMHG

## 2019-09-13 VITALS — DIASTOLIC BLOOD PRESSURE: 68 MMHG | SYSTOLIC BLOOD PRESSURE: 128 MMHG

## 2019-09-13 VITALS — DIASTOLIC BLOOD PRESSURE: 74 MMHG | SYSTOLIC BLOOD PRESSURE: 146 MMHG

## 2019-09-13 VITALS — DIASTOLIC BLOOD PRESSURE: 83 MMHG | SYSTOLIC BLOOD PRESSURE: 165 MMHG

## 2019-09-13 LAB
ALBUMIN SERPL BCP-MCNC: 1.6 G/DL (ref 3.4–5)
ANION GAP SERPL CALCULATED.3IONS-SCNC: 12 MMOL/L (ref 8–16)
BASOPHILS # BLD AUTO: 0 X10'3 (ref 0–0.2)
BASOPHILS NFR BLD AUTO: 0.1 % (ref 0–1)
BUN SERPL-MCNC: 41 MG/DL (ref 7–18)
BUN/CREAT SERPL: 25 (ref 5.4–32)
CALCIUM SERPL-MCNC: 8.5 MG/DL (ref 8.5–10.1)
CHLORIDE SERPL-SCNC: 112 MMOL/L (ref 99–107)
CHOLEST SERPL-MCNC: 82 MG/DL (ref 0–200)
CHOLEST/HDLC SERPL: 5.5 {RATIO} (ref 0–4.99)
CO2 SERPL-SCNC: 20.8 MMOL/L (ref 24–32)
CREAT SERPL-MCNC: 1.64 MG/DL (ref 0.6–1.1)
EOSINOPHIL # BLD AUTO: 0 X10'3 (ref 0–0.9)
EOSINOPHIL NFR BLD AUTO: 0 % (ref 0–6)
ERYTHROCYTE [DISTWIDTH] IN BLOOD BY AUTOMATED COUNT: 14.4 % (ref 11.5–14.5)
GFR SERPL CREATININE-BSD FRML MDRD: 40 ML/MIN
GLUCOSE SERPL-MCNC: 174 MG/DL (ref 70–104)
HCT VFR BLD AUTO: 33.4 % (ref 42–52)
HDLC SERPL-MCNC: 15 MG/DL (ref 35–60)
HGB BLD-MCNC: 11 G/DL (ref 14–17.9)
LDLC SERPL DIRECT ASSAY-MCNC: 45 MG/DL (ref 50–100)
LYMPHOCYTES # BLD AUTO: 1.1 X10'3 (ref 1.1–4.8)
LYMPHOCYTES NFR BLD AUTO: 7 % (ref 21–51)
MAGNESIUM SERPL-MCNC: 2.1 MG/DL (ref 1.5–2.4)
MCH RBC QN AUTO: 28.5 PG (ref 27–31)
MCHC RBC AUTO-ENTMCNC: 32.9 G/DL (ref 33–36.5)
MCV RBC AUTO: 86.6 FL (ref 78–98)
MONOCYTES # BLD AUTO: 1.3 X10'3 (ref 0–0.9)
MONOCYTES NFR BLD AUTO: 8.4 % (ref 2–12)
NEUTROPHILS # BLD AUTO: 13.4 X10'3 (ref 1.8–7.7)
NEUTROPHILS NFR BLD AUTO: 84.5 % (ref 42–75)
PLATELET # BLD AUTO: 277 X10'3 (ref 140–440)
PMV BLD AUTO: 8.6 FL (ref 7.4–10.4)
POTASSIUM SERPL-SCNC: 3.8 MMOL/L (ref 3.5–5.1)
RBC # BLD AUTO: 3.85 X10'6 (ref 4.7–6.1)
SODIUM SERPL-SCNC: 145 MMOL/L (ref 135–145)
TRIGL SERPL-MCNC: 83 MG/DL (ref 20–135)
WBC # BLD AUTO: 15.9 X10'3 (ref 4.5–11)

## 2019-09-13 RX ADMIN — Medication SCH UNIT: at 13:35

## 2019-09-13 RX ADMIN — POTASSIUM CHLORIDE PRN MLS/HR: 7.46 INJECTION, SOLUTION INTRAVENOUS at 02:47

## 2019-09-13 RX ADMIN — INSULIN LISPRO SCH UNITS: 100 INJECTION, SOLUTION INTRAVENOUS; SUBCUTANEOUS at 19:37

## 2019-09-13 RX ADMIN — Medication SCH MMU: at 22:10

## 2019-09-13 RX ADMIN — POLYETHYLENE GLYCOL 3350 SCH GM: 17 POWDER, FOR SOLUTION ORAL at 13:35

## 2019-09-13 RX ADMIN — PANTOPRAZOLE SODIUM SCH MG: 40 TABLET, DELAYED RELEASE ORAL at 13:35

## 2019-09-13 RX ADMIN — ENOXAPARIN SODIUM SCH MG: 100 INJECTION SUBCUTANEOUS at 08:42

## 2019-09-13 RX ADMIN — MULTIVITAMIN SCH ML: LIQUID ORAL at 13:35

## 2019-09-13 RX ADMIN — TAZOBACTAM SODIUM AND PIPERACILLIN SODIUM SCH MLS/HR: 500; 4 INJECTION, SOLUTION INTRAVENOUS at 11:33

## 2019-09-13 RX ADMIN — Medication SCH MG: at 13:41

## 2019-09-13 RX ADMIN — Medication SCH MCG: at 13:34

## 2019-09-13 RX ADMIN — LEVOTHYROXINE SODIUM SCH MCG: 75 TABLET ORAL at 08:00

## 2019-09-13 RX ADMIN — ENOXAPARIN SODIUM SCH MG: 100 INJECTION SUBCUTANEOUS at 08:41

## 2019-09-13 RX ADMIN — TAZOBACTAM SODIUM AND PIPERACILLIN SODIUM SCH MLS/HR: 500; 4 INJECTION, SOLUTION INTRAVENOUS at 02:46

## 2019-09-13 RX ADMIN — INSULIN GLARGINE SCH UNIT: 100 INJECTION, SOLUTION SUBCUTANEOUS at 21:00

## 2019-09-13 RX ADMIN — POTASSIUM CHLORIDE PRN MLS/HR: 7.46 INJECTION, SOLUTION INTRAVENOUS at 00:51

## 2019-09-13 RX ADMIN — Medication SCH MMU: at 13:41

## 2019-09-13 RX ADMIN — INSULIN LISPRO SCH UNITS: 100 INJECTION, SOLUTION INTRAVENOUS; SUBCUTANEOUS at 22:02

## 2019-09-13 RX ADMIN — TAZOBACTAM SODIUM AND PIPERACILLIN SODIUM SCH MLS/HR: 500; 4 INJECTION, SOLUTION INTRAVENOUS at 19:31

## 2019-09-13 RX ADMIN — INSULIN LISPRO SCH UNITS: 100 INJECTION, SOLUTION INTRAVENOUS; SUBCUTANEOUS at 09:28

## 2019-09-13 NOTE — NUR
PAGER ID:  [phone] 

MESSAGE:   [id number] Oj Freedman: Dr Colvin the neurologist from the tele med was wanting you 
to give him a call at [phone]. LISA Piedra Ext [phone]

## 2019-09-13 NOTE — NUR
PAGER ID: [phone]

MESSAGE: RE: ROOM 3022 Oj Freedman Family wants to use own insulin protocol. Would this be 
ok with you? Edgar GONZALEZ [phone]

## 2019-09-13 NOTE — NUR
PRESSURE ULCER EDUCATION:



DEFINITION:  A pressure ulcer is an area of skin that breaks down when you stay in one 
position too long. The constant pressure against the skin reduces the blood flow to that 
area and the affected tissue dies.



CAUSES:

"Being bedridden or in a wheelchair

"Fragile skin

"Having a chronic condition, such as diabetes or vascular disease

"Inability to move certain parts of your body without assistance

"Older age

"Incontinence of urine or stool



SYMPTOMS:

"A reddened area that DOES NOT turn white when pressed on - this can be the beginning of a 
pressure ulcer

"A blister, deep sore or a crater - these can be advanced pressure ulcers



FIRST AID:

"Relieve the pressure on this area

"Keep the area clean and dry

"Call your primary doctor if you see any of the above symptoms

"DO NOT massage the area

"DO NOT use a donut shaped or ring shaped pillow- these actually interfere with the blood 
flow and cause complications



PREVENTION:

"Check for pressure ulcers everyday

"Change position at least every two hours to relieve pressure

"Use items that help relieve pressure- pillows, sheepskin, foam padding, and powders.

"Keep skin clean and dry

"Eat healthy well balanced meals

"Exercise daily



IF YOU SEE ANY OF THESE SYMPTOMS WHILE IN THE HOSPITAL - TELL YOUR NURSE IMMEDIATELY.



IF YOU SEE ANY OF THESE SYMPTOMS WHILE AT HOME OR HAVE ANY QUESTIONS OR CONCERNS  ABOUT 
PRESSURE ULCERS - CALL YOUR PRIMARY DOCTOR IMMEDIATELY.


-------------------------------------------------------------------------------

Addendum: 09/13/19 at 1652 by Cirilo Vance RN

-------------------------------------------------------------------------------

Amended: Links added.

## 2019-09-13 NOTE — NUR
Patient in room PCU 3022. I have received report from  LISA Piedra  and had the opportunity 
to ask questions and assume patient care.

## 2019-09-13 NOTE — NUR
PAGER ID: [phone]

MESSAGE:  [phone] Oj Freedman: Dr Cheng was recommedning a cardio consult for patient. 
LISA Piedra Ext [phone]

## 2019-09-13 NOTE — NUR
Sanchez welsh per MD order critically ill I and O.

-------------------------------------------------------------------------------

Addendum: 09/13/19 at 0114 by Keisha Moss RN

-------------------------------------------------------------------------------

Amended: Links added.

## 2019-09-13 NOTE — NUR
Problems reprioritized. Patient report given, questions answered & plan of care reviewed 
with Tadeo GONZALEZ.

## 2019-09-13 NOTE — NUR
Patient in room PCU 3022. I have received report from  Tadeo GONZALEZ  and had the opportunity 
to ask questions and assume patient care.

## 2019-09-13 NOTE — NUR
Problems reprioritized. Patient report given, questions answered & plan of care reviewed 
with LISA Piedra.

## 2019-09-13 NOTE — NUR
Problems reprioritized. Patient report given, questions answered & plan of care reviewed 
with Juan RN.

## 2019-09-13 NOTE — NUR
Family states baseline left arm is unable to raise past shoulder level due to previous 
shoulder/arm injury. Baseline pt unable to track with just eyes, follows with head movement.

-------------------------------------------------------------------------------

Addendum: 09/13/19 at 2355 by Keisha Moss RN

-------------------------------------------------------------------------------

Amended: Links added.

## 2019-09-13 NOTE — NUR
Patient in room PCU 3022. I have received report from LISA Martinez   and had the opportunity to 
ask questions and assume patient care.

## 2019-09-14 VITALS — SYSTOLIC BLOOD PRESSURE: 163 MMHG | DIASTOLIC BLOOD PRESSURE: 70 MMHG

## 2019-09-14 VITALS — SYSTOLIC BLOOD PRESSURE: 118 MMHG | DIASTOLIC BLOOD PRESSURE: 73 MMHG

## 2019-09-14 VITALS — SYSTOLIC BLOOD PRESSURE: 156 MMHG | DIASTOLIC BLOOD PRESSURE: 76 MMHG

## 2019-09-14 VITALS — SYSTOLIC BLOOD PRESSURE: 160 MMHG | DIASTOLIC BLOOD PRESSURE: 70 MMHG

## 2019-09-14 VITALS — SYSTOLIC BLOOD PRESSURE: 160 MMHG | DIASTOLIC BLOOD PRESSURE: 68 MMHG

## 2019-09-14 VITALS — SYSTOLIC BLOOD PRESSURE: 158 MMHG | DIASTOLIC BLOOD PRESSURE: 78 MMHG

## 2019-09-14 LAB
ALBUMIN SERPL BCP-MCNC: 1.7 G/DL (ref 3.4–5)
ANION GAP SERPL CALCULATED.3IONS-SCNC: 13 MMOL/L (ref 8–16)
BASOPHILS # BLD AUTO: 0 X10'3 (ref 0–0.2)
BASOPHILS NFR BLD AUTO: 0.1 % (ref 0–1)
BUN SERPL-MCNC: 28 MG/DL (ref 7–18)
BUN/CREAT SERPL: 21.7 (ref 5.4–32)
CALCIUM SERPL-MCNC: 9.1 MG/DL (ref 8.5–10.1)
CHLORIDE SERPL-SCNC: 112 MMOL/L (ref 99–107)
CO2 SERPL-SCNC: 23.4 MMOL/L (ref 24–32)
CREAT SERPL-MCNC: 1.29 MG/DL (ref 0.6–1.1)
EOSINOPHIL # BLD AUTO: 0 X10'3 (ref 0–0.9)
EOSINOPHIL NFR BLD AUTO: 0 % (ref 0–6)
ERYTHROCYTE [DISTWIDTH] IN BLOOD BY AUTOMATED COUNT: 14.6 % (ref 11.5–14.5)
GFR SERPL CREATININE-BSD FRML MDRD: 53 ML/MIN
GLUCOSE SERPL-MCNC: 259 MG/DL (ref 70–104)
HCT VFR BLD AUTO: 33.2 % (ref 42–52)
HGB BLD-MCNC: 11.2 G/DL (ref 14–17.9)
LYMPHOCYTES # BLD AUTO: 1.3 X10'3 (ref 1.1–4.8)
LYMPHOCYTES NFR BLD AUTO: 8.6 % (ref 21–51)
MAGNESIUM SERPL-MCNC: 1.9 MG/DL (ref 1.5–2.4)
MCH RBC QN AUTO: 29.1 PG (ref 27–31)
MCHC RBC AUTO-ENTMCNC: 33.7 G/DL (ref 33–36.5)
MCV RBC AUTO: 86.6 FL (ref 78–98)
MONOCYTES # BLD AUTO: 1.4 X10'3 (ref 0–0.9)
MONOCYTES NFR BLD AUTO: 9 % (ref 2–12)
NEUTROPHILS # BLD AUTO: 12.5 X10'3 (ref 1.8–7.7)
NEUTROPHILS NFR BLD AUTO: 82.3 % (ref 42–75)
PLATELET # BLD AUTO: 296 X10'3 (ref 140–440)
PMV BLD AUTO: 8.6 FL (ref 7.4–10.4)
POTASSIUM SERPL-SCNC: 2.4 MMOL/L (ref 3.5–5.1)
RBC # BLD AUTO: 3.83 X10'6 (ref 4.7–6.1)
SODIUM SERPL-SCNC: 148 MMOL/L (ref 135–145)
WBC # BLD AUTO: 15.2 X10'3 (ref 4.5–11)

## 2019-09-14 RX ADMIN — POTASSIUM CHLORIDE PRN MLS/HR: 7.46 INJECTION, SOLUTION INTRAVENOUS at 11:16

## 2019-09-14 RX ADMIN — ENOXAPARIN SODIUM SCH MG: 100 INJECTION SUBCUTANEOUS at 07:03

## 2019-09-14 RX ADMIN — TAZOBACTAM SODIUM AND PIPERACILLIN SODIUM SCH MLS/HR: 500; 4 INJECTION, SOLUTION INTRAVENOUS at 03:11

## 2019-09-14 RX ADMIN — INSULIN LISPRO SCH UNITS: 100 INJECTION, SOLUTION INTRAVENOUS; SUBCUTANEOUS at 12:35

## 2019-09-14 RX ADMIN — POTASSIUM CHLORIDE PRN MLS/HR: 7.46 INJECTION, SOLUTION INTRAVENOUS at 06:49

## 2019-09-14 RX ADMIN — POTASSIUM CHLORIDE PRN MLS/HR: 7.46 INJECTION, SOLUTION INTRAVENOUS at 15:16

## 2019-09-14 RX ADMIN — POLYETHYLENE GLYCOL 3350 SCH GM: 17 POWDER, FOR SOLUTION ORAL at 07:23

## 2019-09-14 RX ADMIN — POTASSIUM CHLORIDE PRN MLS/HR: 7.46 INJECTION, SOLUTION INTRAVENOUS at 09:14

## 2019-09-14 RX ADMIN — INSULIN GLARGINE SCH UNIT: 100 INJECTION, SOLUTION SUBCUTANEOUS at 21:58

## 2019-09-14 RX ADMIN — POTASSIUM CHLORIDE PRN MLS/HR: 7.46 INJECTION, SOLUTION INTRAVENOUS at 12:40

## 2019-09-14 RX ADMIN — TAZOBACTAM SODIUM AND PIPERACILLIN SODIUM SCH MLS/HR: 500; 4 INJECTION, SOLUTION INTRAVENOUS at 19:32

## 2019-09-14 RX ADMIN — INSULIN LISPRO SCH UNITS: 100 INJECTION, SOLUTION INTRAVENOUS; SUBCUTANEOUS at 10:48

## 2019-09-14 RX ADMIN — Medication SCH MMU: at 08:49

## 2019-09-14 RX ADMIN — POTASSIUM CHLORIDE PRN MLS/HR: 7.46 INJECTION, SOLUTION INTRAVENOUS at 21:24

## 2019-09-14 RX ADMIN — POTASSIUM CHLORIDE PRN MLS/HR: 7.46 INJECTION, SOLUTION INTRAVENOUS at 19:36

## 2019-09-14 RX ADMIN — LEVOTHYROXINE SODIUM SCH MCG: 75 TABLET ORAL at 07:27

## 2019-09-14 RX ADMIN — POTASSIUM CHLORIDE PRN MLS/HR: 7.46 INJECTION, SOLUTION INTRAVENOUS at 17:23

## 2019-09-14 RX ADMIN — ONDANSETRON PRN MG: 2 INJECTION, SOLUTION INTRAMUSCULAR; INTRAVENOUS at 03:16

## 2019-09-14 RX ADMIN — TAZOBACTAM SODIUM AND PIPERACILLIN SODIUM SCH MLS/HR: 500; 4 INJECTION, SOLUTION INTRAVENOUS at 11:16

## 2019-09-14 RX ADMIN — SODIUM CHLORIDE SCH MLS/HR: 9 INJECTION INTRAMUSCULAR; INTRAVENOUS; SUBCUTANEOUS at 03:22

## 2019-09-14 RX ADMIN — HYDROCODONE BITARTRATE AND ACETAMINOPHEN PRN TAB: 5; 325 TABLET ORAL at 16:01

## 2019-09-14 RX ADMIN — INSULIN LISPRO SCH UNITS: 100 INJECTION, SOLUTION INTRAVENOUS; SUBCUTANEOUS at 19:45

## 2019-09-14 RX ADMIN — Medication SCH MMU: at 21:23

## 2019-09-14 NOTE — NUR
PAGER ID: [phone]

MESSAGE: RAPHAEL  3022 Oj Freedman: JUANCARLOS 2.4, Will replace per protocol. LISA Piedra Ext [phone]

## 2019-09-14 NOTE — NUR
Called again to patient's wife to reassess persaud because she felt he wasn't having enough 
output and she feared persaud was once again clogged. Patient had approximately 100mL of 
cherry red urine in the bag and appeared to be draining. All bedding was dry. While I was in 
the room patient spontaneously voided and a significant amount of urine sprayed around the 
catheter leaving the patient's bedding wet. Spoke over options with wife and asked if she 
would be okay with nursing attempting to replace the persaud. She stated that it would be the 
best option because it was obvious the other persaud wasn't working any longer. When previous 
persaud was d/c'd there was a visible clot lodged in the tip of the catheter. Placed an 
18French coude tipped catheter using sterile technique with no difficultly. 150mL of laurel 
red urine immediately drained into catheter bag and patient reported immediately relief of 
all discomfort. Will continue to monitor.

## 2019-09-14 NOTE — NUR
Gave Semaj Tyler a notification of the new development of hematuria in the persaud catheter 
and patient's anticoagulant and blood thinner regimen with stoke history.

## 2019-09-14 NOTE — NUR
Dr Shay came into assess the persaud.  Dr Shay gave instructions to inflate the Persaud balloon 
due to leak around the persaud and to irrigate the persaud for possible clots.  

-------------------------------------------------------------------------------

Addendum: 09/14/19 at 1432 by Tadeo Chand RN

-------------------------------------------------------------------------------

Per Dr. Shay 2ml of saline was added to the ballon in the persaud cath and the cath was also 
irrigated per her recommendation.

## 2019-09-14 NOTE — NUR
PAGER ID:  [phone] 

MESSAGE:   [phone] Oj Tano: The Plavix is still okay to give? just wanted to make sure? 
LISA Piedra Ext [phone]

## 2019-09-14 NOTE — NUR
DR SNYDER INFORMED THAT F/C WAS LEAKING AROUND INSERTION SITE.  HEMATURIA TO URINE COLLECTION 
BAG.  I WAS ASKED BY DR SNYDER TO IRRIGATE F/C AND REPLACE COLLECTION BAG.  I BLADDER SCANNED 
BLADDER AND FOUND 450 CC IN BLADDER.  I THEN DISCONNECTED OLD BAG AND IRRIGATED COUDE WITH 
150 CC STERIL WATER.  SCANT FLUID LEAKING OUT AROUND INSERTION SITE.  PT EXPRESSED 
DISCOMFORT " ALL OVER MY BODY".  NEW COLLECTION BAG ATTACHED TO COUDE.  DR SNYDER INFORMED OF 
EVENTS.  SHE WILL CONTACT DR CARRILLO.  HIS LINE IS BUSY RIGHT NOW.  SHE WILL CONTINUE TO 
ATTEMPT TO CONTACT HIS.  WILL CONTINUE TO MONITOR.  PT PAIN TREATED WITH TYLENOL.

-------------------------------------------------------------------------------

Addendum: 09/14/19 at 0931 by Alem Schmitz RN

-------------------------------------------------------------------------------

Amended: Links added.

## 2019-09-14 NOTE — NUR
per protocol would get 13 units, family refused the 13 

units said to get 6 units, family has been educated 

numerous times how our insulin protocol works.

## 2019-09-14 NOTE — NUR
PAGER ID: [phone]

MESSAGE: RM [id number] Oj BAUER came back at 2.7, So I resume protocol. (Per protocol her 
still has 4 bags left of K to run.) LISA Piedra Ext [phone]

## 2019-09-14 NOTE — NUR
Called to patient's room by wife due to continued laurel bleeding into the persaud and leakage 
around the catheter. Wife reports that patient felt the urge to void so he tried and 
proceeded to spray urine from around the catheter. Patient's bedding saturated with bloody 
urine. Bladder scan showed over 450mL of urine in the bladder. Attempted to reposition and 
flush persaud at the recommendation of Dr. Blanca. Flushing yielded multiple small blood clots 
and cherry red urine. Persaud bag changed to allow close monitoring of output to ensure persaud 
was continuing to be patent. Will continue to monitor

## 2019-09-14 NOTE — NUR
Problems reprioritized. Patient report given, questions answered & plan of care reviewed 
with LISA Alexander.

## 2019-09-14 NOTE — NUR
MD NOTIFIED

called Dr. Rangel, pt has new onset blood in urine (Bradford), relayed that pt is on lovenox and 
plavix. asked if we should run new coag studies.

per MD. hold lovenox declined further coag studies

## 2019-09-14 NOTE — NUR
PAGER ID:  [phone] 

MESSAGE:   [phone] Oj Freedman: The wife was wondering if you could come and talk with her, 
she has some concerns with the hematuria. LISA Piedra Ext [phone]

-------------------------------------------------------------------------------

Addendum: 09/14/19 at 1355 by Tadeo Chand RN

-------------------------------------------------------------------------------

At 0

## 2019-09-14 NOTE — NUR
Family not comfort with insulin due to patient no eating.

-------------------------------------------------------------------------------

Addendum: 09/14/19 at 1057 by Tadeo Chand RN

-------------------------------------------------------------------------------

Family also educated on our protocol, they still want to hold off on the insulin for now.

## 2019-09-14 NOTE — NUR
Pt with low Víctor of 11. Per physical assessment pt with no edema or wounds although noted 
that pt with poor PO intake documented with 0% all of yesterday, pending documentation of PO 
intake today. MD notified of RD recommendation for Glucerna TID however MD declined ONS at 
this time. Will continue to follow.

-------------------------------------------------------------------------------

Addendum: 09/14/19 at 1130 by Falguni Moran RD

-------------------------------------------------------------------------------

Amended: Links added.

## 2019-09-15 VITALS — DIASTOLIC BLOOD PRESSURE: 60 MMHG | SYSTOLIC BLOOD PRESSURE: 170 MMHG

## 2019-09-15 VITALS — SYSTOLIC BLOOD PRESSURE: 165 MMHG | DIASTOLIC BLOOD PRESSURE: 80 MMHG

## 2019-09-15 VITALS — SYSTOLIC BLOOD PRESSURE: 170 MMHG | DIASTOLIC BLOOD PRESSURE: 80 MMHG

## 2019-09-15 VITALS — SYSTOLIC BLOOD PRESSURE: 170 MMHG | DIASTOLIC BLOOD PRESSURE: 70 MMHG

## 2019-09-15 VITALS — DIASTOLIC BLOOD PRESSURE: 80 MMHG | SYSTOLIC BLOOD PRESSURE: 165 MMHG

## 2019-09-15 LAB
ALBUMIN SERPL BCP-MCNC: 1.6 G/DL (ref 3.4–5)
ANION GAP SERPL CALCULATED.3IONS-SCNC: 8 MMOL/L (ref 8–16)
ANISOCYTOSIS BLD QL SMEAR: (no result)
BACTERIA URNS QL MICRO: (no result) /HPF
BASOPHILS # BLD AUTO: 0 X10'3 (ref 0–0.2)
BASOPHILS NFR BLD AUTO: 0 % (ref 0–1)
BUN SERPL-MCNC: 21 MG/DL (ref 7–18)
BUN/CREAT SERPL: 15.9 (ref 5.4–32)
CALCIUM SERPL-MCNC: 8.5 MG/DL (ref 8.5–10.1)
CHLORIDE SERPL-SCNC: 110 MMOL/L (ref 99–107)
CLARITY UR: (no result)
CO2 SERPL-SCNC: 28.3 MMOL/L (ref 24–32)
COLOR UR: (no result)
CREAT SERPL-MCNC: 1.32 MG/DL (ref 0.6–1.1)
DEPRECATED SQUAMOUS URNS QL MICRO: (no result) /LPF
EOSINOPHIL # BLD AUTO: 0 X10'3 (ref 0–0.9)
EOSINOPHIL NFR BLD AUTO: 0.2 % (ref 0–6)
ERYTHROCYTE [DISTWIDTH] IN BLOOD BY AUTOMATED COUNT: 14.4 % (ref 11.5–14.5)
GFR SERPL CREATININE-BSD FRML MDRD: 52 ML/MIN
GLUCOSE SERPL-MCNC: 293 MG/DL (ref 70–104)
GLUCOSE UR STRIP-MCNC: (no result) MG/DL
HCT VFR BLD AUTO: 31.4 % (ref 42–52)
HGB BLD-MCNC: 10.5 G/DL (ref 14–17.9)
HGB UR QL STRIP: (no result)
KETONES UR STRIP-MCNC: (no result) MG/DL
LEUKOCYTE ESTERASE UR QL STRIP: (no result)
LYMPHOCYTES # BLD AUTO: 1.6 X10'3 (ref 1.1–4.8)
LYMPHOCYTES NFR BLD AUTO: 10.5 % (ref 21–51)
LYMPHOCYTES NFR BLD MANUAL: 15 % (ref 21–51)
MAGNESIUM SERPL-MCNC: 1.7 MG/DL (ref 1.5–2.4)
MCH RBC QN AUTO: 28.7 PG (ref 27–31)
MCHC RBC AUTO-ENTMCNC: 33.3 G/DL (ref 33–36.5)
MCV RBC AUTO: 86.2 FL (ref 78–98)
METAMYELOCYTES NFR BLD MANUAL: 2 % (ref 0–0)
MICROCYTES BLD QL SMEAR: (no result)
MONOCYTES # BLD AUTO: 1.6 X10'3 (ref 0–0.9)
MONOCYTES NFR BLD AUTO: 10.2 % (ref 2–12)
MONOCYTES NFR BLD MANUAL: 5 % (ref 2–12)
MUCOUS THREADS URNS QL MICRO: (no result) /LPF
NEUTROPHILS # BLD AUTO: 12.1 X10'3 (ref 1.8–7.7)
NEUTROPHILS NFR BLD AUTO: 79.1 % (ref 42–75)
NEUTS SEG NFR BLD MANUAL: 78 % (ref 42–75)
NITRITE UR QL STRIP: (no result)
PH UR STRIP: (no result) [PH] (ref 4.8–8)
PLATELET # BLD AUTO: 294 X10'3 (ref 140–440)
PLATELET BLD QL SMEAR: NORMAL
PMV BLD AUTO: 8.5 FL (ref 7.4–10.4)
POTASSIUM SERPL-SCNC: 3 MMOL/L (ref 3.5–5.1)
PROT UR QL STRIP: (no result) MG/DL
RBC # BLD AUTO: 3.65 X10'6 (ref 4.7–6.1)
RBC #/AREA URNS HPF: (no result) /HPF (ref 0–2)
RBC MORPH BLD: (no result)
SODIUM SERPL-SCNC: 146 MMOL/L (ref 135–145)
SP GR UR STRIP: (no result) (ref 1–1.03)
TOTAL CELLS COUNTED FLD: 100
URN COLLECT METHOD CLASS: (no result)
UROBILINOGEN UR STRIP-MCNC: (no result) E.U/DL (ref 0.2–1)
WBC # BLD AUTO: 15.4 X10'3 (ref 4.5–11)
WBC #/AREA URNS HPF: (no result) /HPF (ref 0–4)
WBC CLUMPS #/AREA URNS HPF: (no result) /HPF

## 2019-09-15 RX ADMIN — POTASSIUM CHLORIDE PRN MLS/HR: 7.46 INJECTION, SOLUTION INTRAVENOUS at 04:11

## 2019-09-15 RX ADMIN — INSULIN GLARGINE SCH UNIT: 100 INJECTION, SOLUTION SUBCUTANEOUS at 21:29

## 2019-09-15 RX ADMIN — POTASSIUM CHLORIDE PRN MLS/HR: 7.46 INJECTION, SOLUTION INTRAVENOUS at 05:19

## 2019-09-15 RX ADMIN — TAZOBACTAM SODIUM AND PIPERACILLIN SODIUM SCH MLS/HR: 500; 4 INJECTION, SOLUTION INTRAVENOUS at 02:57

## 2019-09-15 RX ADMIN — TAZOBACTAM SODIUM AND PIPERACILLIN SODIUM SCH MLS/HR: 500; 4 INJECTION, SOLUTION INTRAVENOUS at 11:16

## 2019-09-15 RX ADMIN — INSULIN LISPRO SCH UNITS: 100 INJECTION, SOLUTION INTRAVENOUS; SUBCUTANEOUS at 13:54

## 2019-09-15 RX ADMIN — Medication SCH MMU: at 08:15

## 2019-09-15 RX ADMIN — Medication SCH CAN: at 18:55

## 2019-09-15 RX ADMIN — LEVOTHYROXINE SODIUM SCH MCG: 75 TABLET ORAL at 08:15

## 2019-09-15 RX ADMIN — INSULIN LISPRO SCH UNITS: 100 INJECTION, SOLUTION INTRAVENOUS; SUBCUTANEOUS at 19:22

## 2019-09-15 RX ADMIN — PANTOPRAZOLE SODIUM SCH MG: 40 TABLET, DELAYED RELEASE ORAL at 08:14

## 2019-09-15 RX ADMIN — POTASSIUM CHLORIDE PRN MLS/HR: 7.46 INJECTION, SOLUTION INTRAVENOUS at 01:39

## 2019-09-15 RX ADMIN — INSULIN LISPRO SCH UNITS: 100 INJECTION, SOLUTION INTRAVENOUS; SUBCUTANEOUS at 09:20

## 2019-09-15 RX ADMIN — Medication SCH CAN: at 13:00

## 2019-09-15 RX ADMIN — POTASSIUM CHLORIDE PRN MLS/HR: 7.46 INJECTION, SOLUTION INTRAVENOUS at 08:14

## 2019-09-15 RX ADMIN — POTASSIUM CHLORIDE PRN MLS/HR: 7.46 INJECTION, SOLUTION INTRAVENOUS at 09:22

## 2019-09-15 RX ADMIN — TAZOBACTAM SODIUM AND PIPERACILLIN SODIUM SCH MLS/HR: 500; 4 INJECTION, SOLUTION INTRAVENOUS at 19:22

## 2019-09-15 RX ADMIN — POTASSIUM CHLORIDE PRN MLS/HR: 7.46 INJECTION, SOLUTION INTRAVENOUS at 03:00

## 2019-09-15 RX ADMIN — Medication SCH MMU: at 19:22

## 2019-09-15 NOTE — NUR
PAGER ID:  [phone] 

MESSAGE:  Patient Oj Freedman Rm 3028 BP is 170/60. He doesn't have any PRNs for blood 
pressure. Should we give him anything? Thank you! Leyda GONZALEZ ext. [phone]

## 2019-09-15 NOTE — NUR
Patient in room PCU 3022. I have received report from   LISA Alexander  and had the opportunity to 
ask questions and assume patient care.

## 2019-09-15 NOTE — NUR
Problems reprioritized. Patient report given, questions answered & plan of care reviewed 
with LISA Crabtree.

## 2019-09-15 NOTE — NUR
Student Medication Administration:

For this medication-pass time frame, all medication were reviewed, dispensed, administered 
and documented per hospital policy by Lien ESCALANTE. 



Student documentation:

I have reviewed and agree with all interventions, assessments performed and documented by 
Lien ESCALANTE.

## 2019-09-15 NOTE — NUR
Patient in room PCU 3022. I have received report from  Catherine GONZALEZ  and had the opportunity to 
ask questions and assume patient care.

## 2019-09-15 NOTE — NUR
Problems reprioritized. Patient report given, questions answered & plan of care reviewed 
with Gladis GONZALEZ.

## 2019-09-15 NOTE — NUR
Initial: Pt admit w/ CVA, NSTEMI, KASSANDRA, and uretheral stricture requiring 

catheter per MD note. Pt AOx2 advanced to pureed/thin/carb controlled w/ 

feeder for safety per SLP/MD. Pt 0% PO following first meal 100% past 4 

days. DRISS d/w MD who agrees to Glucerna TIDWM given low PO; pending 

verification prior to sending on trays. Per MD note pt family reports will 

likely not eat pureed foods though cannot liberalize unless SLP/MD 

approves. LBM 9/14. Pt receiving MVI and electrolyte replacement per 

protocol.  receiving prednisone on hyperglycemia protocol. Given 0% 

PO past 4 days, severe weakness, BLE +2 edema pt qualifies for severe 

malnutrition at this time; MD notified. Pt not appropriate for 

malnutrition/DM eds this admit. Will continue to monitor.

Rec:

1. continue pureed/thin/carb controlled diet per MD/SLP; encourage PO

2. Glucerna TIDWM

3. weekly wts

-------------------------------------------------------------------------------

Addendum: 09/15/19 at 1141 by Zac Saldivar RD

-------------------------------------------------------------------------------

Amended: Links added.

## 2019-09-15 NOTE — NUR
Patient in room PCU 3022. I have received report from Gladis GONZALEZ  and had the opportunity to 
ask questions and assume patient care.

## 2019-09-15 NOTE — NUR
pt had urine leaking around persaud catheter. baloon deflated, catheter advanced a small 
amount, balloon reinflated and 250ml out of persaud

## 2019-09-15 NOTE — NUR
Spoke with Dr. Miller. Recieved order for Potassium PO 20mEq X 1 to complete replacement. 
Recheck K in AM.

## 2019-09-16 VITALS — SYSTOLIC BLOOD PRESSURE: 160 MMHG | DIASTOLIC BLOOD PRESSURE: 50 MMHG

## 2019-09-16 VITALS — SYSTOLIC BLOOD PRESSURE: 122 MMHG | DIASTOLIC BLOOD PRESSURE: 52 MMHG

## 2019-09-16 VITALS — DIASTOLIC BLOOD PRESSURE: 64 MMHG | SYSTOLIC BLOOD PRESSURE: 126 MMHG

## 2019-09-16 VITALS — SYSTOLIC BLOOD PRESSURE: 124 MMHG | DIASTOLIC BLOOD PRESSURE: 76 MMHG

## 2019-09-16 VITALS — DIASTOLIC BLOOD PRESSURE: 70 MMHG | SYSTOLIC BLOOD PRESSURE: 112 MMHG

## 2019-09-16 VITALS — SYSTOLIC BLOOD PRESSURE: 123 MMHG | DIASTOLIC BLOOD PRESSURE: 70 MMHG

## 2019-09-16 LAB
ALBUMIN SERPL BCP-MCNC: 1.7 G/DL (ref 3.4–5)
ANION GAP SERPL CALCULATED.3IONS-SCNC: 7 MMOL/L (ref 8–16)
BASOPHILS # BLD AUTO: 0 X10'3 (ref 0–0.2)
BASOPHILS NFR BLD AUTO: 0.1 % (ref 0–1)
BUN SERPL-MCNC: 13 MG/DL (ref 7–18)
BUN/CREAT SERPL: 12 (ref 5.4–32)
CALCIUM SERPL-MCNC: 7.8 MG/DL (ref 8.5–10.1)
CHLORIDE SERPL-SCNC: 103 MMOL/L (ref 99–107)
CO2 SERPL-SCNC: 26.8 MMOL/L (ref 24–32)
CREAT SERPL-MCNC: 1.08 MG/DL (ref 0.6–1.1)
EOSINOPHIL # BLD AUTO: 0.4 X10'3 (ref 0–0.9)
EOSINOPHIL NFR BLD AUTO: 2.3 % (ref 0–6)
ERYTHROCYTE [DISTWIDTH] IN BLOOD BY AUTOMATED COUNT: 14.3 % (ref 11.5–14.5)
GFR SERPL CREATININE-BSD FRML MDRD: 65 ML/MIN
GLUCOSE SERPL-MCNC: 351 MG/DL (ref 70–104)
HCT VFR BLD AUTO: 31 % (ref 42–52)
HGB BLD-MCNC: 10.2 G/DL (ref 14–17.9)
LYMPHOCYTES # BLD AUTO: 1.9 X10'3 (ref 1.1–4.8)
LYMPHOCYTES NFR BLD AUTO: 12.8 % (ref 21–51)
MAGNESIUM SERPL-MCNC: 1.4 MG/DL (ref 1.5–2.4)
MCH RBC QN AUTO: 28.4 PG (ref 27–31)
MCHC RBC AUTO-ENTMCNC: 33 G/DL (ref 33–36.5)
MCV RBC AUTO: 86.1 FL (ref 78–98)
MONOCYTES # BLD AUTO: 1.2 X10'3 (ref 0–0.9)
MONOCYTES NFR BLD AUTO: 8.2 % (ref 2–12)
NEUTROPHILS # BLD AUTO: 11.5 X10'3 (ref 1.8–7.7)
NEUTROPHILS NFR BLD AUTO: 76.6 % (ref 42–75)
PLATELET # BLD AUTO: 286 X10'3 (ref 140–440)
PMV BLD AUTO: 8.2 FL (ref 7.4–10.4)
POTASSIUM SERPL-SCNC: 3.2 MMOL/L (ref 3.5–5.1)
POTASSIUM SERPL-SCNC: 3.2 MMOL/L (ref 3.5–5.1)
RBC # BLD AUTO: 3.6 X10'6 (ref 4.7–6.1)
SODIUM SERPL-SCNC: 137 MMOL/L (ref 135–145)
WBC # BLD AUTO: 15 X10'3 (ref 4.5–11)

## 2019-09-16 RX ADMIN — TAZOBACTAM SODIUM AND PIPERACILLIN SODIUM SCH MLS/HR: 500; 4 INJECTION, SOLUTION INTRAVENOUS at 19:01

## 2019-09-16 RX ADMIN — POTASSIUM CHLORIDE PRN MEQ: 1500 TABLET, EXTENDED RELEASE ORAL at 08:04

## 2019-09-16 RX ADMIN — LEVOTHYROXINE SODIUM SCH MCG: 75 TABLET ORAL at 08:00

## 2019-09-16 RX ADMIN — Medication PRN MG: at 20:30

## 2019-09-16 RX ADMIN — INSULIN LISPRO SCH UNITS: 100 INJECTION, SOLUTION INTRAVENOUS; SUBCUTANEOUS at 19:03

## 2019-09-16 RX ADMIN — TAZOBACTAM SODIUM AND PIPERACILLIN SODIUM SCH MLS/HR: 500; 4 INJECTION, SOLUTION INTRAVENOUS at 11:18

## 2019-09-16 RX ADMIN — INSULIN LISPRO SCH UNITS: 100 INJECTION, SOLUTION INTRAVENOUS; SUBCUTANEOUS at 13:42

## 2019-09-16 RX ADMIN — Medication SCH MG: at 07:59

## 2019-09-16 RX ADMIN — TAZOBACTAM SODIUM AND PIPERACILLIN SODIUM SCH MLS/HR: 500; 4 INJECTION, SOLUTION INTRAVENOUS at 03:23

## 2019-09-16 RX ADMIN — POTASSIUM CHLORIDE PRN MEQ: 1500 TABLET, EXTENDED RELEASE ORAL at 16:23

## 2019-09-16 RX ADMIN — Medication SCH MMU: at 07:58

## 2019-09-16 RX ADMIN — Medication SCH MMU: at 19:01

## 2019-09-16 RX ADMIN — INSULIN GLARGINE SCH UNIT: 100 INJECTION, SOLUTION SUBCUTANEOUS at 20:48

## 2019-09-16 RX ADMIN — POTASSIUM CHLORIDE PRN MEQ: 1500 TABLET, EXTENDED RELEASE ORAL at 12:27

## 2019-09-16 RX ADMIN — INSULIN LISPRO SCH UNITS: 100 INJECTION, SOLUTION INTRAVENOUS; SUBCUTANEOUS at 09:05

## 2019-09-16 RX ADMIN — Medication SCH CAN: at 18:02

## 2019-09-16 RX ADMIN — PANTOPRAZOLE SODIUM SCH MG: 40 TABLET, DELAYED RELEASE ORAL at 07:59

## 2019-09-16 RX ADMIN — Medication SCH CAN: at 07:59

## 2019-09-16 RX ADMIN — Medication PRN MG: at 08:04

## 2019-09-16 RX ADMIN — Medication SCH CAN: at 13:00

## 2019-09-16 NOTE — NUR
Problems reprioritized. Patient report given, questions answered & plan of care reviewed 
with LISA ZAMORA.

## 2019-09-16 NOTE — NUR
NEW HIRE RN documentation:

I have reviewed and agree with all interventions, assessments performed and documented by 
LISA WALKER.

## 2019-09-16 NOTE — NUR
Problems reprioritized. Patient report given, questions answered & plan of care reviewed 
with Chavez RN.

## 2019-09-16 NOTE — NUR
Patient in room PCU 3022. I have received report from    and had the opportunity to ask 
questions and assume patient care.

## 2019-09-16 NOTE — NUR
Patient in room PCU 3022. I have received report from  Chavez GONZALEZ  and had the opportunity to 
ask questions and assume patient care.

## 2019-09-16 NOTE — NUR
PAGER ID:  [phone] 

MESSAGE:  DR. RODRIGUEZ, 3022/CARISA, K+3.2 ONLY HAVE IV ORDERS. PO REPLACEMENT PLEASE? MG+ 1.2, 
need replacement orders? WIFE IS ASKING ABOUT AN EXTENDED PIV AND WANTING THE SURGEON TO SEE 
THEM. MAKI [phone]. TY

## 2019-09-17 VITALS — DIASTOLIC BLOOD PRESSURE: 72 MMHG | SYSTOLIC BLOOD PRESSURE: 156 MMHG

## 2019-09-17 VITALS — SYSTOLIC BLOOD PRESSURE: 152 MMHG | DIASTOLIC BLOOD PRESSURE: 60 MMHG

## 2019-09-17 VITALS — SYSTOLIC BLOOD PRESSURE: 146 MMHG | DIASTOLIC BLOOD PRESSURE: 58 MMHG

## 2019-09-17 VITALS — DIASTOLIC BLOOD PRESSURE: 68 MMHG | SYSTOLIC BLOOD PRESSURE: 140 MMHG

## 2019-09-17 VITALS — SYSTOLIC BLOOD PRESSURE: 150 MMHG | DIASTOLIC BLOOD PRESSURE: 64 MMHG

## 2019-09-17 LAB
ALBUMIN SERPL BCP-MCNC: 1.4 G/DL (ref 3.4–5)
ANION GAP SERPL CALCULATED.3IONS-SCNC: 6 MMOL/L (ref 8–16)
BASE EXCESS BLDA CALC-SCNC: -1.5 MMOL/L (ref -2–3)
BASOPHILS # BLD AUTO: 0 X10'3 (ref 0–0.2)
BASOPHILS NFR BLD AUTO: 0.1 % (ref 0–1)
BODY TEMPERATURE: 37.1
BUN SERPL-MCNC: 10 MG/DL (ref 7–18)
BUN/CREAT SERPL: 8.8 (ref 5.4–32)
CALCIUM SERPL-MCNC: 7.4 MG/DL (ref 8.5–10.1)
CHLORIDE SERPL-SCNC: 105 MMOL/L (ref 99–107)
CO2 SERPL-SCNC: 26.9 MMOL/L (ref 24–32)
COHGB MFR BLDA: 0.3 % (ref 0.5–1.5)
CREAT SERPL-MCNC: 1.14 MG/DL (ref 0.6–1.1)
EOSINOPHIL # BLD AUTO: 0.5 X10'3 (ref 0–0.9)
EOSINOPHIL NFR BLD AUTO: 3.3 % (ref 0–6)
EOSINOPHIL NFR BLD MANUAL: 6 % (ref 0–6)
ERYTHROCYTE [DISTWIDTH] IN BLOOD BY AUTOMATED COUNT: 14.6 % (ref 11.5–14.5)
GFR SERPL CREATININE-BSD FRML MDRD: 61 ML/MIN
GLUCOSE SERPL-MCNC: 152 MG/DL (ref 70–104)
HCO3 BLDA-SCNC: 22.3 MMOL/L (ref 22–26)
HCT VFR BLD AUTO: 26.3 % (ref 42–52)
HGB BLD-MCNC: 8.6 G/DL (ref 14–17.9)
HGB BLDA-MCNC: 11.1 G/DL (ref 14–17.9)
HYPOCHROMIA BLD QL SMEAR: (no result)
LYMPHOCYTES # BLD AUTO: 2.5 X10'3 (ref 1.1–4.8)
LYMPHOCYTES NFR BLD AUTO: 15.6 % (ref 21–51)
LYMPHOCYTES NFR BLD MANUAL: 15 % (ref 21–51)
MAGNESIUM SERPL-MCNC: 1.2 MG/DL (ref 1.5–2.4)
MCH RBC QN AUTO: 28.6 PG (ref 27–31)
MCHC RBC AUTO-ENTMCNC: 32.7 G/DL (ref 33–36.5)
MCV RBC AUTO: 87.4 FL (ref 78–98)
METAMYELOCYTES NFR BLD MANUAL: 5 % (ref 0–0)
METHGB MFR BLDA: 0.1 % (ref 0.3–1.12)
MONOCYTES # BLD AUTO: 1.4 X10'3 (ref 0–0.9)
MONOCYTES NFR BLD AUTO: 8.5 % (ref 2–12)
MONOCYTES NFR BLD MANUAL: 5 % (ref 2–12)
MYELOCYTES NFR BLD MANUAL: 4 % (ref 0–0)
NEUTROPHILS # BLD AUTO: 11.6 X10'3 (ref 1.8–7.7)
NEUTROPHILS NFR BLD AUTO: 72.5 % (ref 42–75)
NEUTS BAND # BLD MANUAL: 2 % (ref 0–10)
NEUTS SEG NFR BLD MANUAL: 63 % (ref 42–75)
O2/TOTAL GAS SETTING VFR VENT: 21 MMHG/%
OXYHGB MFR BLDA: 94.8 % (ref 94–100)
PCO2 TEMP ADJ BLDA: 34.1 MMHG (ref 35–45)
PH TEMP ADJ BLDA: 7.43 [PH] (ref 7.35–7.45)
PLATELET # BLD AUTO: 245 X10'3 (ref 140–440)
PLATELET BLD QL SMEAR: NORMAL
PMV BLD AUTO: 8.3 FL (ref 7.4–10.4)
PO2 TEMP ADJ BLD: 77.6 MMHG (ref 83–108)
POIKILOCYTOSIS BLD QL SMEAR: (no result)
POTASSIUM SERPL-SCNC: 3.1 MMOL/L (ref 3.5–5.1)
RBC # BLD AUTO: 3.01 X10'6 (ref 4.7–6.1)
RBC MORPH BLD: (no result)
SAO2 % BLDA: 95.2 % (ref 95–98)
SODIUM SERPL-SCNC: 138 MMOL/L (ref 135–145)
TOTAL CELLS COUNTED FLD: 100
WBC # BLD AUTO: 16 X10'3 (ref 4.5–11)

## 2019-09-17 RX ADMIN — Medication SCH MG: at 07:55

## 2019-09-17 RX ADMIN — Medication SCH CAN: at 07:59

## 2019-09-17 RX ADMIN — PANTOPRAZOLE SODIUM SCH MG: 40 TABLET, DELAYED RELEASE ORAL at 07:55

## 2019-09-17 RX ADMIN — POTASSIUM CHLORIDE PRN MEQ: 1500 TABLET, EXTENDED RELEASE ORAL at 09:17

## 2019-09-17 RX ADMIN — POTASSIUM CHLORIDE PRN MEQ: 1500 TABLET, EXTENDED RELEASE ORAL at 18:58

## 2019-09-17 RX ADMIN — INSULIN LISPRO SCH UNITS: 100 INJECTION, SOLUTION INTRAVENOUS; SUBCUTANEOUS at 13:25

## 2019-09-17 RX ADMIN — LEVOTHYROXINE SODIUM SCH MCG: 75 TABLET ORAL at 07:56

## 2019-09-17 RX ADMIN — INSULIN LISPRO SCH UNITS: 100 INJECTION, SOLUTION INTRAVENOUS; SUBCUTANEOUS at 19:03

## 2019-09-17 RX ADMIN — Medication PRN MG: at 17:33

## 2019-09-17 RX ADMIN — INSULIN LISPRO SCH UNITS: 100 INJECTION, SOLUTION INTRAVENOUS; SUBCUTANEOUS at 08:12

## 2019-09-17 RX ADMIN — TAZOBACTAM SODIUM AND PIPERACILLIN SODIUM SCH MLS/HR: 500; 4 INJECTION, SOLUTION INTRAVENOUS at 11:06

## 2019-09-17 RX ADMIN — TAZOBACTAM SODIUM AND PIPERACILLIN SODIUM SCH MLS/HR: 375; 3 INJECTION, SOLUTION INTRAVENOUS at 15:45

## 2019-09-17 RX ADMIN — Medication SCH MMU: at 19:03

## 2019-09-17 RX ADMIN — Medication PRN MG: at 09:17

## 2019-09-17 RX ADMIN — Medication SCH CAN: at 19:04

## 2019-09-17 RX ADMIN — POTASSIUM CHLORIDE PRN MEQ: 1500 TABLET, EXTENDED RELEASE ORAL at 14:37

## 2019-09-17 RX ADMIN — Medication SCH MMU: at 07:56

## 2019-09-17 RX ADMIN — Medication SCH CAN: at 13:28

## 2019-09-17 RX ADMIN — TAZOBACTAM SODIUM AND PIPERACILLIN SODIUM SCH MLS/HR: 500; 4 INJECTION, SOLUTION INTRAVENOUS at 02:32

## 2019-09-17 RX ADMIN — INSULIN GLARGINE SCH UNIT: 100 INJECTION, SOLUTION SUBCUTANEOUS at 21:14

## 2019-09-17 NOTE — NUR
Patient has voided since removal of persaud catheter. He was incontinent of urine and the 
urine was clear yellow in color.

## 2019-09-17 NOTE — NUR
Patient in room PCU 3022. I have received report from Leyda GONZALEZ & Marizol GONZALEZ and had the 
opportunity to ask questions and assume patient care.

## 2019-09-17 NOTE — NUR
Problems reprioritized. Patient report given, questions answered & plan of care reviewed 
with Leticia GONZALEZ and Reshma GONZALEZ.

## 2019-09-17 NOTE — NUR
Problems reprioritized. Patient report given, questions answered & plan of care reviewed 
with Leyda GONZALEZ & Marizol GONZALEZ.

## 2019-09-17 NOTE — NUR
Orientee documentation:

I have reviewed and agree with all interventions, assessments performed and documented by 
LISA Justice.



Orientee Medication Administration:

For this medication-pass time frame, all medication were reviewed, dispensed, administered 
and documented per hospital policy by LISA Justice.

## 2019-09-17 NOTE — NUR
Patient in room PCU 3022. I have received report from   Leticia GONZALEZ and had the opportunity to 
ask questions and assume patient care.

## 2019-09-17 NOTE — NUR
Víctor trigger: Víctor 11; skin intact. Pt remains non-verbal but wife 

present at bedside seen by RD and provided written/verbal malnutrition/DM 

eds w/ RD contact information. Pt SO requests double veggies TID, low kcal 

yogurt BIDLD; RD d/w dietary. SO reports pt 25-50% PO glucernas here but 

baseline at home is 50% glucernas. LBM 9/16. Pt PO increased to 25-50% avg 

meals w/ 25% avg glucerna today. Will continue to monitor.

Rec:

1. continue pureed/thin/carb controlled diet per MD/SLP; encourage PO

2. Glucerna TIDWM; double veggies TID, yogurt BIDLD

3. weekly wts

-------------------------------------------------------------------------------

Addendum: 09/17/19 at 1314 by Zac Saldivar RD

-------------------------------------------------------------------------------

Amended: Links added.

## 2019-09-18 VITALS — SYSTOLIC BLOOD PRESSURE: 138 MMHG | DIASTOLIC BLOOD PRESSURE: 54 MMHG

## 2019-09-18 VITALS — DIASTOLIC BLOOD PRESSURE: 72 MMHG | SYSTOLIC BLOOD PRESSURE: 146 MMHG

## 2019-09-18 VITALS — SYSTOLIC BLOOD PRESSURE: 145 MMHG | DIASTOLIC BLOOD PRESSURE: 69 MMHG

## 2019-09-18 LAB
ALBUMIN SERPL BCP-MCNC: 1.4 G/DL (ref 3.4–5)
ANION GAP SERPL CALCULATED.3IONS-SCNC: 8 MMOL/L (ref 8–16)
BASOPHILS # BLD AUTO: 0 X10'3 (ref 0–0.2)
BASOPHILS NFR BLD AUTO: 0 % (ref 0–1)
BUN SERPL-MCNC: 13 MG/DL (ref 7–18)
BUN/CREAT SERPL: 12.4 (ref 5.4–32)
CALCIUM SERPL-MCNC: 7.5 MG/DL (ref 8.5–10.1)
CHLORIDE SERPL-SCNC: 104 MMOL/L (ref 99–107)
CO2 SERPL-SCNC: 22.6 MMOL/L (ref 24–32)
CREAT SERPL-MCNC: 1.05 MG/DL (ref 0.6–1.1)
EOSINOPHIL # BLD AUTO: 0.4 X10'3 (ref 0–0.9)
EOSINOPHIL NFR BLD AUTO: 2.1 % (ref 0–6)
EOSINOPHIL NFR BLD MANUAL: 1 % (ref 0–6)
ERYTHROCYTE [DISTWIDTH] IN BLOOD BY AUTOMATED COUNT: 14.3 % (ref 11.5–14.5)
GFR SERPL CREATININE-BSD FRML MDRD: 67 ML/MIN
GLUCOSE SERPL-MCNC: 235 MG/DL (ref 70–104)
HCT VFR BLD AUTO: 26.7 % (ref 42–52)
HGB BLD-MCNC: 8.7 G/DL (ref 14–17.9)
LYMPHOCYTES # BLD AUTO: 2.1 X10'3 (ref 1.1–4.8)
LYMPHOCYTES NFR BLD AUTO: 11.1 % (ref 21–51)
LYMPHOCYTES NFR BLD MANUAL: 9 % (ref 21–51)
MAGNESIUM SERPL-MCNC: 1.1 MG/DL (ref 1.5–2.4)
MCH RBC QN AUTO: 28.5 PG (ref 27–31)
MCHC RBC AUTO-ENTMCNC: 32.7 G/DL (ref 33–36.5)
MCV RBC AUTO: 87.1 FL (ref 78–98)
METAMYELOCYTES NFR BLD MANUAL: 5 % (ref 0–0)
MONOCYTES # BLD AUTO: 1.6 X10'3 (ref 0–0.9)
MONOCYTES NFR BLD AUTO: 8.6 % (ref 2–12)
MONOCYTES NFR BLD MANUAL: 10 % (ref 2–12)
MYELOCYTES NFR BLD MANUAL: 1 % (ref 0–0)
NEUTROPHILS # BLD AUTO: 14.6 X10'3 (ref 1.8–7.7)
NEUTROPHILS NFR BLD AUTO: 78.2 % (ref 42–75)
NEUTS BAND # BLD MANUAL: 1 % (ref 0–10)
NEUTS SEG NFR BLD MANUAL: 73 % (ref 42–75)
PLATELET # BLD AUTO: 240 X10'3 (ref 140–440)
PLATELET BLD QL SMEAR: NORMAL
PMV BLD AUTO: 8.4 FL (ref 7.4–10.4)
POLYCHROMASIA BLD QL SMEAR: (no result)
POTASSIUM SERPL-SCNC: 4.1 MMOL/L (ref 3.5–5.1)
RBC # BLD AUTO: 3.06 X10'6 (ref 4.7–6.1)
RBC MORPH BLD: (no result)
SODIUM SERPL-SCNC: 135 MMOL/L (ref 135–145)
TOTAL CELLS COUNTED FLD: 100
WBC # BLD AUTO: 18.7 X10'3 (ref 4.5–11)

## 2019-09-18 RX ADMIN — LEVOTHYROXINE SODIUM SCH MCG: 75 TABLET ORAL at 07:27

## 2019-09-18 RX ADMIN — Medication SCH MMU: at 07:26

## 2019-09-18 RX ADMIN — TAZOBACTAM SODIUM AND PIPERACILLIN SODIUM SCH MLS/HR: 375; 3 INJECTION, SOLUTION INTRAVENOUS at 08:34

## 2019-09-18 RX ADMIN — Medication PRN MG: at 07:26

## 2019-09-18 RX ADMIN — INSULIN LISPRO SCH UNITS: 100 INJECTION, SOLUTION INTRAVENOUS; SUBCUTANEOUS at 13:48

## 2019-09-18 RX ADMIN — Medication SCH CAN: at 08:00

## 2019-09-18 RX ADMIN — Medication SCH MG: at 07:26

## 2019-09-18 RX ADMIN — TAZOBACTAM SODIUM AND PIPERACILLIN SODIUM SCH MLS/HR: 375; 3 INJECTION, SOLUTION INTRAVENOUS at 00:04

## 2019-09-18 RX ADMIN — PANTOPRAZOLE SODIUM SCH MG: 40 TABLET, DELAYED RELEASE ORAL at 07:27

## 2019-09-18 RX ADMIN — INSULIN LISPRO SCH UNITS: 100 INJECTION, SOLUTION INTRAVENOUS; SUBCUTANEOUS at 09:11

## 2019-09-18 NOTE — NUR
Patient in room PCU 3022. I have received report from Leyda GONZALEZ and Margareth GONZALEZ and had the 
opportunity to ask questions and assume patient care.

## 2019-09-18 NOTE — NUR
Problems reprioritized. Patient report given, questions answered & plan of care reviewed 
with Jose GONZALEZ.

## 2019-09-18 NOTE — NUR
Orientee documentation:

I have reviewed and agree with all interventions, assessments performed and documented by 
Marizol GONZALEZ.



Orientee Medication Administration:

For this medication-pass time frame, all medication were reviewed, dispensed, administered 
and documented per hospital policy by Marizol GONZALEZ.

## 2019-09-18 NOTE — NUR
Pt transferred to [facility] rehab.  Pt stable upon DC and vitals within normal limits.  
Report called to [facility] to Jitendra.  Pt's belongings gathered and sent with wife.  
[employer] [employer] arrived to unit and transferred Pt to [facility].  [employer] personal given 
Pt packet.  Pt and [employer]ie [employer] personal left in [facility] van to [facility].

## 2019-09-27 ENCOUNTER — HOSPITAL ENCOUNTER (INPATIENT)
Dept: HOSPITAL 94 - ER | Age: 84
LOS: 5 days | Discharge: NURSING, REHAB OR LONG-TERM CARE | DRG: 280 | End: 2019-10-02
Attending: SPECIALIST | Admitting: SPECIALIST
Payer: MEDICARE

## 2019-09-27 VITALS — DIASTOLIC BLOOD PRESSURE: 78 MMHG | SYSTOLIC BLOOD PRESSURE: 146 MMHG

## 2019-09-27 VITALS — DIASTOLIC BLOOD PRESSURE: 83 MMHG | SYSTOLIC BLOOD PRESSURE: 149 MMHG

## 2019-09-27 VITALS — HEIGHT: 65 IN | BODY MASS INDEX: 30.52 KG/M2 | WEIGHT: 183.2 LBS

## 2019-09-27 VITALS — DIASTOLIC BLOOD PRESSURE: 75 MMHG | SYSTOLIC BLOOD PRESSURE: 129 MMHG

## 2019-09-27 VITALS — SYSTOLIC BLOOD PRESSURE: 146 MMHG | DIASTOLIC BLOOD PRESSURE: 84 MMHG

## 2019-09-27 VITALS — DIASTOLIC BLOOD PRESSURE: 78 MMHG | SYSTOLIC BLOOD PRESSURE: 144 MMHG

## 2019-09-27 VITALS — SYSTOLIC BLOOD PRESSURE: 127 MMHG | DIASTOLIC BLOOD PRESSURE: 79 MMHG

## 2019-09-27 VITALS — DIASTOLIC BLOOD PRESSURE: 102 MMHG | SYSTOLIC BLOOD PRESSURE: 143 MMHG

## 2019-09-27 VITALS — SYSTOLIC BLOOD PRESSURE: 135 MMHG | DIASTOLIC BLOOD PRESSURE: 86 MMHG

## 2019-09-27 DIAGNOSIS — J18.1: ICD-10-CM

## 2019-09-27 DIAGNOSIS — E43: ICD-10-CM

## 2019-09-27 DIAGNOSIS — N40.0: ICD-10-CM

## 2019-09-27 DIAGNOSIS — Z91.018: ICD-10-CM

## 2019-09-27 DIAGNOSIS — E03.9: ICD-10-CM

## 2019-09-27 DIAGNOSIS — E11.319: ICD-10-CM

## 2019-09-27 DIAGNOSIS — I13.0: ICD-10-CM

## 2019-09-27 DIAGNOSIS — Z85.46: ICD-10-CM

## 2019-09-27 DIAGNOSIS — E78.00: ICD-10-CM

## 2019-09-27 DIAGNOSIS — I25.10: ICD-10-CM

## 2019-09-27 DIAGNOSIS — I25.2: ICD-10-CM

## 2019-09-27 DIAGNOSIS — Z79.899: ICD-10-CM

## 2019-09-27 DIAGNOSIS — Z79.4: ICD-10-CM

## 2019-09-27 DIAGNOSIS — Z86.73: ICD-10-CM

## 2019-09-27 DIAGNOSIS — N18.9: ICD-10-CM

## 2019-09-27 DIAGNOSIS — R62.7: ICD-10-CM

## 2019-09-27 DIAGNOSIS — E11.40: ICD-10-CM

## 2019-09-27 DIAGNOSIS — I21.4: ICD-10-CM

## 2019-09-27 DIAGNOSIS — K59.00: ICD-10-CM

## 2019-09-27 DIAGNOSIS — Z95.1: ICD-10-CM

## 2019-09-27 DIAGNOSIS — D64.9: ICD-10-CM

## 2019-09-27 DIAGNOSIS — N17.9: ICD-10-CM

## 2019-09-27 DIAGNOSIS — M54.9: ICD-10-CM

## 2019-09-27 DIAGNOSIS — E78.5: ICD-10-CM

## 2019-09-27 DIAGNOSIS — I50.23: Primary | ICD-10-CM

## 2019-09-27 DIAGNOSIS — G89.29: ICD-10-CM

## 2019-09-27 DIAGNOSIS — E11.22: ICD-10-CM

## 2019-09-27 LAB
ALBUMIN SERPL BCP-MCNC: 1.6 G/DL (ref 3.4–5)
ALBUMIN/GLOB SERPL: 0.4 {RATIO} (ref 1.1–1.5)
ALP SERPL-CCNC: 62 IU/L (ref 46–116)
ALT SERPL W P-5'-P-CCNC: 42 U/L (ref 12–78)
ANION GAP SERPL CALCULATED.3IONS-SCNC: 7 MMOL/L (ref 8–16)
APTT PPP: 31 SECONDS (ref 22–32)
AST SERPL W P-5'-P-CCNC: 189 U/L (ref 10–37)
BASOPHILS # BLD AUTO: 0.1 X10'3 (ref 0–0.2)
BASOPHILS NFR BLD AUTO: 0.4 % (ref 0–1)
BILIRUB SERPL-MCNC: 0.4 MG/DL (ref 0.1–1)
BUN SERPL-MCNC: 13 MG/DL (ref 7–18)
BUN/CREAT SERPL: 10.2 (ref 6.6–38)
CALCIUM SERPL-MCNC: 8.4 MG/DL (ref 8.5–10.1)
CHLORIDE SERPL-SCNC: 107 MMOL/L (ref 99–107)
CO2 SERPL-SCNC: 27.5 MMOL/L (ref 24–32)
CREAT SERPL-MCNC: 1.27 MG/DL (ref 0.4–0.9)
EOSINOPHIL # BLD AUTO: 0.1 X10'3 (ref 0–0.9)
EOSINOPHIL NFR BLD AUTO: 0.6 % (ref 0–6)
ERYTHROCYTE [DISTWIDTH] IN BLOOD BY AUTOMATED COUNT: 15.5 % (ref 11.5–14.5)
ERYTHROCYTE [DISTWIDTH] IN BLOOD BY AUTOMATED COUNT: 15.6 % (ref 11.5–14.5)
ERYTHROCYTE [DISTWIDTH] IN BLOOD BY AUTOMATED COUNT: 15.7 % (ref 11.5–14.5)
GFR SERPL CREATININE-BSD FRML MDRD: 40 ML/MIN
GLUCOSE SERPL-MCNC: 89 MG/DL (ref 70–104)
HCT VFR BLD AUTO: 23.5 % (ref 35–45)
HCT VFR BLD AUTO: 32.9 % (ref 42–52)
HCT VFR BLD AUTO: 33.7 % (ref 42–52)
HGB BLD-MCNC: 10.9 G/DL (ref 14–17.9)
HGB BLD-MCNC: 11.1 G/DL (ref 14–17.9)
HGB BLD-MCNC: 7.8 G/DL (ref 12–16)
LYMPHOCYTES # BLD AUTO: 1.5 X10'3 (ref 1.1–4.8)
LYMPHOCYTES NFR BLD AUTO: 12.9 % (ref 21–51)
MAGNESIUM SERPL-MCNC: 1.6 MG/DL (ref 1.5–2.4)
MCH RBC QN AUTO: 28.5 PG (ref 27–31)
MCH RBC QN AUTO: 28.9 PG (ref 27–31)
MCH RBC QN AUTO: 29 PG (ref 27–31)
MCHC RBC AUTO-ENTMCNC: 33 G/DL (ref 33–36.5)
MCHC RBC AUTO-ENTMCNC: 33.1 G/DL (ref 33–36.5)
MCHC RBC AUTO-ENTMCNC: 33.1 G/DL (ref 33–36.5)
MCV RBC AUTO: 86.1 FL (ref 78–98)
MCV RBC AUTO: 87.5 FL (ref 78–98)
MCV RBC AUTO: 87.6 FL (ref 78–98)
MONOCYTES # BLD AUTO: 1 X10'3 (ref 0–0.9)
MONOCYTES NFR BLD AUTO: 8.4 % (ref 2–12)
NEUTROPHILS # BLD AUTO: 8.9 X10'3 (ref 1.8–7.7)
NEUTROPHILS NFR BLD AUTO: 77.7 % (ref 42–75)
PLATELET # BLD AUTO: 286 X10'3 (ref 140–440)
PLATELET # BLD AUTO: 300 X10'3 (ref 140–440)
PLATELET # BLD AUTO: 348 X10'3 (ref 140–440)
PMV BLD AUTO: 7.7 FL (ref 7.4–10.4)
PMV BLD AUTO: 7.8 FL (ref 7.4–10.4)
PMV BLD AUTO: 8.2 FL (ref 7.4–10.4)
POTASSIUM SERPL-SCNC: 3.7 MMOL/L (ref 3.5–5.1)
PROT SERPL-MCNC: 5.6 G/DL (ref 6.4–8.2)
RBC # BLD AUTO: 2.74 X10'6 (ref 4.2–5.6)
RBC # BLD AUTO: 3.75 X10'6 (ref 4.7–6.1)
RBC # BLD AUTO: 3.85 X10'6 (ref 4.7–6.1)
SODIUM SERPL-SCNC: 141 MMOL/L (ref 135–145)
WBC # BLD AUTO: 10.9 X10'3 (ref 4.5–11)
WBC # BLD AUTO: 11.5 X10'3 (ref 4.5–11)
WBC # BLD AUTO: 9.8 X10'3 (ref 4.5–11)

## 2019-09-27 PROCEDURE — 97161 PT EVAL LOW COMPLEX 20 MIN: CPT

## 2019-09-27 PROCEDURE — 99285 EMERGENCY DEPT VISIT HI MDM: CPT

## 2019-09-27 PROCEDURE — 83880 ASSAY OF NATRIURETIC PEPTIDE: CPT

## 2019-09-27 PROCEDURE — 80053 COMPREHEN METABOLIC PANEL: CPT

## 2019-09-27 PROCEDURE — 71045 X-RAY EXAM CHEST 1 VIEW: CPT

## 2019-09-27 PROCEDURE — 94667 MNPJ CHEST WALL 1ST: CPT

## 2019-09-27 PROCEDURE — 83605 ASSAY OF LACTIC ACID: CPT

## 2019-09-27 PROCEDURE — 86901 BLOOD TYPING SEROLOGIC RH(D): CPT

## 2019-09-27 PROCEDURE — 93459 L HRT ART/GRFT ANGIO: CPT

## 2019-09-27 PROCEDURE — 87040 BLOOD CULTURE FOR BACTERIA: CPT

## 2019-09-27 PROCEDURE — 83735 ASSAY OF MAGNESIUM: CPT

## 2019-09-27 PROCEDURE — 82948 REAGENT STRIP/BLOOD GLUCOSE: CPT

## 2019-09-27 PROCEDURE — 36415 COLL VENOUS BLD VENIPUNCTURE: CPT

## 2019-09-27 PROCEDURE — 93308 TTE F-UP OR LMTD: CPT

## 2019-09-27 PROCEDURE — 96365 THER/PROPH/DIAG IV INF INIT: CPT

## 2019-09-27 PROCEDURE — 85610 PROTHROMBIN TIME: CPT

## 2019-09-27 PROCEDURE — 86920 COMPATIBILITY TEST SPIN: CPT

## 2019-09-27 PROCEDURE — 30233N1 TRANSFUSION OF NONAUTOLOGOUS RED BLOOD CELLS INTO PERIPHERAL VEIN, PERCUTANEOUS APPROACH: ICD-10-PCS | Performed by: INTERNAL MEDICINE

## 2019-09-27 PROCEDURE — 94760 N-INVAS EAR/PLS OXIMETRY 1: CPT

## 2019-09-27 PROCEDURE — 80061 LIPID PANEL: CPT

## 2019-09-27 PROCEDURE — 85025 COMPLETE CBC W/AUTO DIFF WBC: CPT

## 2019-09-27 PROCEDURE — 97530 THERAPEUTIC ACTIVITIES: CPT

## 2019-09-27 PROCEDURE — 85027 COMPLETE CBC AUTOMATED: CPT

## 2019-09-27 PROCEDURE — 86900 BLOOD TYPING SEROLOGIC ABO: CPT

## 2019-09-27 PROCEDURE — 81003 URINALYSIS AUTO W/O SCOPE: CPT

## 2019-09-27 PROCEDURE — 97110 THERAPEUTIC EXERCISES: CPT

## 2019-09-27 PROCEDURE — 96368 THER/DIAG CONCURRENT INF: CPT

## 2019-09-27 PROCEDURE — 99152 MOD SED SAME PHYS/QHP 5/>YRS: CPT

## 2019-09-27 PROCEDURE — 36430 TRANSFUSION BLD/BLD COMPNT: CPT

## 2019-09-27 PROCEDURE — 93005 ELECTROCARDIOGRAM TRACING: CPT

## 2019-09-27 PROCEDURE — 86885 COOMBS TEST INDIRECT QUAL: CPT

## 2019-09-27 PROCEDURE — 84145 PROCALCITONIN (PCT): CPT

## 2019-09-27 PROCEDURE — 85730 THROMBOPLASTIN TIME PARTIAL: CPT

## 2019-09-27 PROCEDURE — 84484 ASSAY OF TROPONIN QUANT: CPT

## 2019-09-27 RX ADMIN — THERA TABS SCH EACH: TAB at 08:16

## 2019-09-27 RX ADMIN — Medication SCH MMU: at 20:28

## 2019-09-27 RX ADMIN — CEFEPIME SCH MLS/HR: 1 INJECTION, POWDER, FOR SOLUTION INTRAMUSCULAR; INTRAVENOUS at 08:44

## 2019-09-27 RX ADMIN — INSULIN GLARGINE SCH UNIT: 100 INJECTION, SOLUTION SUBCUTANEOUS at 20:46

## 2019-09-27 RX ADMIN — Medication SCH MCG: at 08:15

## 2019-09-27 RX ADMIN — POLYETHYLENE GLYCOL 3350 SCH GM: 17 POWDER, FOR SOLUTION ORAL at 08:44

## 2019-09-27 RX ADMIN — CEFEPIME SCH MLS/HR: 1 INJECTION, POWDER, FOR SOLUTION INTRAMUSCULAR; INTRAVENOUS at 16:46

## 2019-09-27 RX ADMIN — LEVOTHYROXINE SODIUM SCH MCG: 75 TABLET ORAL at 08:18

## 2019-09-27 NOTE — NUR
PAGER ID: [phone]

MESSAGE:  [id number] Oj Freedman: Last stay persaud was placed by urologist because when the 
last nurse tried it cause bleeding to the uretha. Do you want a urologist tp place the persaud 
or us to try first? Adair Piedra ext [phone]

## 2019-09-27 NOTE — NUR
Problems reprioritized. Patient report given, questions answered & plan of care reviewed 
with LISA Rodriguez.

## 2019-09-28 VITALS — SYSTOLIC BLOOD PRESSURE: 149 MMHG | DIASTOLIC BLOOD PRESSURE: 75 MMHG

## 2019-09-28 VITALS — SYSTOLIC BLOOD PRESSURE: 141 MMHG | DIASTOLIC BLOOD PRESSURE: 77 MMHG

## 2019-09-28 VITALS — SYSTOLIC BLOOD PRESSURE: 151 MMHG | DIASTOLIC BLOOD PRESSURE: 77 MMHG

## 2019-09-28 VITALS — SYSTOLIC BLOOD PRESSURE: 143 MMHG | DIASTOLIC BLOOD PRESSURE: 68 MMHG

## 2019-09-28 VITALS — SYSTOLIC BLOOD PRESSURE: 144 MMHG | DIASTOLIC BLOOD PRESSURE: 82 MMHG

## 2019-09-28 VITALS — SYSTOLIC BLOOD PRESSURE: 145 MMHG | DIASTOLIC BLOOD PRESSURE: 77 MMHG

## 2019-09-28 VITALS — DIASTOLIC BLOOD PRESSURE: 68 MMHG | SYSTOLIC BLOOD PRESSURE: 133 MMHG

## 2019-09-28 VITALS — DIASTOLIC BLOOD PRESSURE: 83 MMHG | SYSTOLIC BLOOD PRESSURE: 148 MMHG

## 2019-09-28 VITALS — DIASTOLIC BLOOD PRESSURE: 71 MMHG | SYSTOLIC BLOOD PRESSURE: 127 MMHG

## 2019-09-28 VITALS — DIASTOLIC BLOOD PRESSURE: 84 MMHG | SYSTOLIC BLOOD PRESSURE: 146 MMHG

## 2019-09-28 VITALS — SYSTOLIC BLOOD PRESSURE: 142 MMHG | DIASTOLIC BLOOD PRESSURE: 78 MMHG

## 2019-09-28 VITALS — SYSTOLIC BLOOD PRESSURE: 134 MMHG | DIASTOLIC BLOOD PRESSURE: 70 MMHG

## 2019-09-28 VITALS — DIASTOLIC BLOOD PRESSURE: 70 MMHG | SYSTOLIC BLOOD PRESSURE: 146 MMHG

## 2019-09-28 LAB
ALBUMIN SERPL BCP-MCNC: 1.7 G/DL (ref 3.4–5)
ALBUMIN/GLOB SERPL: 0.4 {RATIO} (ref 1.1–1.5)
ALP SERPL-CCNC: 67 IU/L (ref 46–116)
ALT SERPL W P-5'-P-CCNC: 36 U/L (ref 12–78)
ANION GAP SERPL CALCULATED.3IONS-SCNC: 10 MMOL/L (ref 8–16)
AST SERPL W P-5'-P-CCNC: 90 U/L (ref 10–37)
BASOPHILS # BLD AUTO: 0.1 X10'3 (ref 0–0.2)
BASOPHILS NFR BLD AUTO: 1.1 % (ref 0–1)
BILIRUB SERPL-MCNC: 0.4 MG/DL (ref 0.1–1)
BUN SERPL-MCNC: 13 MG/DL (ref 7–18)
BUN/CREAT SERPL: 10.4 (ref 5.4–32)
CALCIUM SERPL-MCNC: 8.1 MG/DL (ref 8.5–10.1)
CHLORIDE SERPL-SCNC: 104 MMOL/L (ref 99–107)
CO2 SERPL-SCNC: 26.1 MMOL/L (ref 24–32)
CREAT SERPL-MCNC: 1.25 MG/DL (ref 0.6–1.1)
EOSINOPHIL # BLD AUTO: 0.2 X10'3 (ref 0–0.9)
EOSINOPHIL NFR BLD AUTO: 1.7 % (ref 0–6)
ERYTHROCYTE [DISTWIDTH] IN BLOOD BY AUTOMATED COUNT: 15.4 % (ref 11.5–14.5)
ERYTHROCYTE [DISTWIDTH] IN BLOOD BY AUTOMATED COUNT: 15.9 % (ref 11.5–14.5)
ERYTHROCYTE [DISTWIDTH] IN BLOOD BY AUTOMATED COUNT: 15.9 % (ref 11.5–14.5)
GFR SERPL CREATININE-BSD FRML MDRD: 55 ML/MIN
GLUCOSE SERPL-MCNC: 203 MG/DL (ref 70–104)
HCT VFR BLD AUTO: 31.7 % (ref 42–52)
HCT VFR BLD AUTO: 31.8 % (ref 42–52)
HCT VFR BLD AUTO: 32.2 % (ref 42–52)
HGB BLD-MCNC: 10.7 G/DL (ref 14–17.9)
HGB BLD-MCNC: 10.8 G/DL (ref 14–17.9)
HGB BLD-MCNC: 10.9 G/DL (ref 14–17.9)
LYMPHOCYTES # BLD AUTO: 1 X10'3 (ref 1.1–4.8)
LYMPHOCYTES NFR BLD AUTO: 10.8 % (ref 21–51)
MCH RBC QN AUTO: 29.1 PG (ref 27–31)
MCH RBC QN AUTO: 29.4 PG (ref 27–31)
MCH RBC QN AUTO: 29.7 PG (ref 27–31)
MCHC RBC AUTO-ENTMCNC: 33.8 G/DL (ref 33–36.5)
MCHC RBC AUTO-ENTMCNC: 33.9 G/DL (ref 33–36.5)
MCHC RBC AUTO-ENTMCNC: 33.9 G/DL (ref 33–36.5)
MCV RBC AUTO: 85.9 FL (ref 78–98)
MCV RBC AUTO: 86.7 FL (ref 78–98)
MCV RBC AUTO: 87.7 FL (ref 78–98)
MONOCYTES # BLD AUTO: 1 X10'3 (ref 0–0.9)
MONOCYTES NFR BLD AUTO: 10.5 % (ref 2–12)
NEUTROPHILS # BLD AUTO: 7.1 X10'3 (ref 1.8–7.7)
NEUTROPHILS NFR BLD AUTO: 75.9 % (ref 42–75)
PLATELET # BLD AUTO: 281 X10'3 (ref 140–440)
PLATELET # BLD AUTO: 289 X10'3 (ref 140–440)
PLATELET # BLD AUTO: 294 X10'3 (ref 140–440)
PMV BLD AUTO: 7.9 FL (ref 7.4–10.4)
PMV BLD AUTO: 7.9 FL (ref 7.4–10.4)
PMV BLD AUTO: 8.2 FL (ref 7.4–10.4)
POTASSIUM SERPL-SCNC: 3.6 MMOL/L (ref 3.5–5.1)
PROT SERPL-MCNC: 6 G/DL (ref 6.4–8.2)
RBC # BLD AUTO: 3.66 X10'6 (ref 4.7–6.1)
RBC # BLD AUTO: 3.68 X10'6 (ref 4.7–6.1)
RBC # BLD AUTO: 3.69 X10'6 (ref 4.7–6.1)
SODIUM SERPL-SCNC: 140 MMOL/L (ref 135–145)
TROPONIN I SERPL-MCNC: 17.52 NG/ML (ref 0–0.05)
WBC # BLD AUTO: 8.7 X10'3 (ref 4.5–11)
WBC # BLD AUTO: 9.4 X10'3 (ref 4.5–11)
WBC # BLD AUTO: 9.5 X10'3 (ref 4.5–11)

## 2019-09-28 PROCEDURE — B2131ZZ FLUOROSCOPY OF MULTIPLE CORONARY ARTERY BYPASS GRAFTS USING LOW OSMOLAR CONTRAST: ICD-10-PCS | Performed by: INTERNAL MEDICINE

## 2019-09-28 PROCEDURE — B2151ZZ FLUOROSCOPY OF LEFT HEART USING LOW OSMOLAR CONTRAST: ICD-10-PCS | Performed by: INTERNAL MEDICINE

## 2019-09-28 PROCEDURE — 4A023N7 MEASUREMENT OF CARDIAC SAMPLING AND PRESSURE, LEFT HEART, PERCUTANEOUS APPROACH: ICD-10-PCS | Performed by: INTERNAL MEDICINE

## 2019-09-28 PROCEDURE — B2111ZZ FLUOROSCOPY OF MULTIPLE CORONARY ARTERIES USING LOW OSMOLAR CONTRAST: ICD-10-PCS | Performed by: INTERNAL MEDICINE

## 2019-09-28 RX ADMIN — DOCUSATE SODIUM SCH MG: 100 CAPSULE, LIQUID FILLED ORAL at 22:28

## 2019-09-28 RX ADMIN — Medication SCH MG: at 09:06

## 2019-09-28 RX ADMIN — Medication SCH MMU: at 22:28

## 2019-09-28 RX ADMIN — POLYETHYLENE GLYCOL 3350 SCH GM: 17 POWDER, FOR SOLUTION ORAL at 08:42

## 2019-09-28 RX ADMIN — Medication SCH MMU: at 08:41

## 2019-09-28 RX ADMIN — CEFEPIME SCH MLS/HR: 1 INJECTION, POWDER, FOR SOLUTION INTRAMUSCULAR; INTRAVENOUS at 08:44

## 2019-09-28 RX ADMIN — ONDANSETRON PRN MG: 2 INJECTION, SOLUTION INTRAMUSCULAR; INTRAVENOUS at 22:58

## 2019-09-28 RX ADMIN — CEFEPIME SCH MLS/HR: 1 INJECTION, POWDER, FOR SOLUTION INTRAMUSCULAR; INTRAVENOUS at 16:11

## 2019-09-28 RX ADMIN — CARVEDILOL SCH MG: 6.25 TABLET, FILM COATED ORAL at 22:27

## 2019-09-28 RX ADMIN — LEVOTHYROXINE SODIUM SCH MCG: 75 TABLET ORAL at 08:42

## 2019-09-28 RX ADMIN — INSULIN GLARGINE SCH UNIT: 100 INJECTION, SOLUTION SUBCUTANEOUS at 21:00

## 2019-09-28 RX ADMIN — CARVEDILOL SCH MG: 6.25 TABLET, FILM COATED ORAL at 08:40

## 2019-09-28 RX ADMIN — PANTOPRAZOLE SODIUM SCH MG: 40 TABLET, DELAYED RELEASE ORAL at 22:29

## 2019-09-28 RX ADMIN — THERA TABS SCH EACH: TAB at 08:41

## 2019-09-28 RX ADMIN — CEFEPIME SCH MLS/HR: 1 INJECTION, POWDER, FOR SOLUTION INTRAMUSCULAR; INTRAVENOUS at 00:32

## 2019-09-28 RX ADMIN — Medication SCH MCG: at 08:42

## 2019-09-28 NOTE — NUR
PAGED DR ÁLVAREZ

PAGER ID:  [phone] 

MESSAGE:  MASSIEL FORREST [phone] CARISA DAUGHTER WANTS YOU TO ORDER PBNP AND CXR TOMORROW PLEASE

## 2019-09-28 NOTE — NUR
received report from LISA Pelayo. just after receiving report LISA Iniguez from cath lab came to 
get pt. pt alert and oriented, in no distress. wife at bedside. pt taken to cath lab by LISA Iniguez and myself.

## 2019-09-28 NOTE — NUR
Lantus held

per md orders (nurse instructions) all insulin to be dosed per pt family wishes.  per 
conversation with family no lantus because pt is not eating at the moment.

## 2019-09-28 NOTE — NUR
Patient in room PCU 3027. I have received report from  Jessika ANNE RN and had the 
opportunity to ask questions and assume patient care.

## 2019-09-28 NOTE — NUR
Problems reprioritized. Patient report given, questions answered & plan of care reviewed 
with CARY RN.

## 2019-09-29 VITALS — DIASTOLIC BLOOD PRESSURE: 62 MMHG | SYSTOLIC BLOOD PRESSURE: 125 MMHG

## 2019-09-29 VITALS — DIASTOLIC BLOOD PRESSURE: 72 MMHG | SYSTOLIC BLOOD PRESSURE: 139 MMHG

## 2019-09-29 VITALS — SYSTOLIC BLOOD PRESSURE: 142 MMHG | DIASTOLIC BLOOD PRESSURE: 55 MMHG

## 2019-09-29 VITALS — DIASTOLIC BLOOD PRESSURE: 69 MMHG | SYSTOLIC BLOOD PRESSURE: 139 MMHG

## 2019-09-29 VITALS — DIASTOLIC BLOOD PRESSURE: 62 MMHG | SYSTOLIC BLOOD PRESSURE: 130 MMHG

## 2019-09-29 VITALS — SYSTOLIC BLOOD PRESSURE: 150 MMHG | DIASTOLIC BLOOD PRESSURE: 77 MMHG

## 2019-09-29 LAB
ALBUMIN SERPL BCP-MCNC: 1.5 G/DL (ref 3.4–5)
ALBUMIN/GLOB SERPL: 0.4 {RATIO} (ref 1.1–1.5)
ALP SERPL-CCNC: 59 IU/L (ref 46–116)
ALT SERPL W P-5'-P-CCNC: 29 U/L (ref 12–78)
ANION GAP SERPL CALCULATED.3IONS-SCNC: 8 MMOL/L (ref 8–16)
AST SERPL W P-5'-P-CCNC: 44 U/L (ref 10–37)
BASOPHILS # BLD AUTO: 0 X10'3 (ref 0–0.2)
BASOPHILS NFR BLD AUTO: 0.5 % (ref 0–1)
BILIRUB SERPL-MCNC: 0.4 MG/DL (ref 0.1–1)
BUN SERPL-MCNC: 14 MG/DL (ref 7–18)
BUN/CREAT SERPL: 10.4 (ref 5.4–32)
CALCIUM SERPL-MCNC: 8.2 MG/DL (ref 8.5–10.1)
CHLORIDE SERPL-SCNC: 105 MMOL/L (ref 99–107)
CHOLEST SERPL-MCNC: 116 MG/DL (ref 0–200)
CHOLEST/HDLC SERPL: 3.6 {RATIO} (ref 0–4.99)
CLARITY UR: CLEAR
CO2 SERPL-SCNC: 28.7 MMOL/L (ref 24–32)
COLOR UR: YELLOW
CREAT SERPL-MCNC: 1.34 MG/DL (ref 0.6–1.1)
EOSINOPHIL # BLD AUTO: 0.2 X10'3 (ref 0–0.9)
EOSINOPHIL NFR BLD AUTO: 2.2 % (ref 0–6)
ERYTHROCYTE [DISTWIDTH] IN BLOOD BY AUTOMATED COUNT: 15.2 % (ref 11.5–14.5)
ERYTHROCYTE [DISTWIDTH] IN BLOOD BY AUTOMATED COUNT: 15.2 % (ref 11.5–14.5)
GFR SERPL CREATININE-BSD FRML MDRD: 51 ML/MIN
GLUCOSE SERPL-MCNC: 231 MG/DL (ref 70–104)
GLUCOSE UR STRIP-MCNC: NEGATIVE MG/DL
HCT VFR BLD AUTO: 29.2 % (ref 42–52)
HCT VFR BLD AUTO: 31.6 % (ref 42–52)
HDLC SERPL-MCNC: 32 MG/DL (ref 35–60)
HGB BLD-MCNC: 10.5 G/DL (ref 14–17.9)
HGB BLD-MCNC: 9.9 G/DL (ref 14–17.9)
HGB UR QL STRIP: NEGATIVE
KETONES UR STRIP-MCNC: NEGATIVE MG/DL
LDLC SERPL DIRECT ASSAY-MCNC: 72 MG/DL (ref 50–100)
LEUKOCYTE ESTERASE UR QL STRIP: NEGATIVE
LYMPHOCYTES # BLD AUTO: 1 X10'3 (ref 1.1–4.8)
LYMPHOCYTES NFR BLD AUTO: 13.6 % (ref 21–51)
MAGNESIUM SERPL-MCNC: 1.3 MG/DL (ref 1.5–2.4)
MCH RBC QN AUTO: 28.7 PG (ref 27–31)
MCH RBC QN AUTO: 29.4 PG (ref 27–31)
MCHC RBC AUTO-ENTMCNC: 33.3 G/DL (ref 33–36.5)
MCHC RBC AUTO-ENTMCNC: 33.9 G/DL (ref 33–36.5)
MCV RBC AUTO: 86.1 FL (ref 78–98)
MCV RBC AUTO: 86.8 FL (ref 78–98)
MONOCYTES # BLD AUTO: 0.8 X10'3 (ref 0–0.9)
MONOCYTES NFR BLD AUTO: 10.9 % (ref 2–12)
NEUTROPHILS # BLD AUTO: 5.1 X10'3 (ref 1.8–7.7)
NEUTROPHILS NFR BLD AUTO: 72.8 % (ref 42–75)
NITRITE UR QL STRIP: NEGATIVE
PH UR STRIP: 6 [PH] (ref 4.8–8)
PLATELET # BLD AUTO: 263 X10'3 (ref 140–440)
PLATELET # BLD AUTO: 272 X10'3 (ref 140–440)
PMV BLD AUTO: 8 FL (ref 7.4–10.4)
PMV BLD AUTO: 8.1 FL (ref 7.4–10.4)
POTASSIUM SERPL-SCNC: 3.4 MMOL/L (ref 3.5–5.1)
PROT SERPL-MCNC: 5.6 G/DL (ref 6.4–8.2)
PROT UR QL STRIP: NEGATIVE MG/DL
RBC # BLD AUTO: 3.37 X10'6 (ref 4.7–6.1)
RBC # BLD AUTO: 3.68 X10'6 (ref 4.7–6.1)
SODIUM SERPL-SCNC: 142 MMOL/L (ref 135–145)
SP GR UR STRIP: <=1.005 (ref 1–1.03)
TRIGL SERPL-MCNC: 75 MG/DL (ref 20–135)
URN COLLECT METHOD CLASS: (no result)
UROBILINOGEN UR STRIP-MCNC: 0.2 E.U/DL (ref 0.2–1)
WBC # BLD AUTO: 6.7 X10'3 (ref 4.5–11)
WBC # BLD AUTO: 7 X10'3 (ref 4.5–11)

## 2019-09-29 RX ADMIN — Medication SCH CAN: at 18:34

## 2019-09-29 RX ADMIN — INSULIN LISPRO SCH UNITS: 100 INJECTION, SOLUTION INTRAVENOUS; SUBCUTANEOUS at 13:42

## 2019-09-29 RX ADMIN — Medication SCH CAN: at 13:41

## 2019-09-29 RX ADMIN — CARVEDILOL SCH MG: 6.25 TABLET, FILM COATED ORAL at 10:07

## 2019-09-29 RX ADMIN — Medication SCH MMU: at 09:55

## 2019-09-29 RX ADMIN — LEVOTHYROXINE SODIUM SCH MCG: 75 TABLET ORAL at 09:55

## 2019-09-29 RX ADMIN — Medication PRN MG: at 11:31

## 2019-09-29 RX ADMIN — INSULIN GLARGINE SCH UNIT: 100 INJECTION, SOLUTION SUBCUTANEOUS at 21:26

## 2019-09-29 RX ADMIN — POTASSIUM CHLORIDE PRN MEQ: 1500 TABLET, EXTENDED RELEASE ORAL at 13:45

## 2019-09-29 RX ADMIN — CEFEPIME SCH MLS/HR: 1 INJECTION, POWDER, FOR SOLUTION INTRAMUSCULAR; INTRAVENOUS at 00:28

## 2019-09-29 RX ADMIN — CARVEDILOL SCH MG: 6.25 TABLET, FILM COATED ORAL at 19:19

## 2019-09-29 RX ADMIN — Medication SCH MCG: at 09:55

## 2019-09-29 RX ADMIN — INSULIN LISPRO SCH UNITS: 100 INJECTION, SOLUTION INTRAVENOUS; SUBCUTANEOUS at 19:17

## 2019-09-29 RX ADMIN — SACUBITRIL AND VALSARTAN SCH EACH: 24; 26 TABLET, FILM COATED ORAL at 19:19

## 2019-09-29 RX ADMIN — DOCUSATE SODIUM SCH MG: 100 CAPSULE, LIQUID FILLED ORAL at 19:19

## 2019-09-29 RX ADMIN — THERA TABS SCH EACH: TAB at 09:55

## 2019-09-29 RX ADMIN — ONDANSETRON PRN MG: 2 INJECTION, SOLUTION INTRAMUSCULAR; INTRAVENOUS at 08:25

## 2019-09-29 RX ADMIN — PANTOPRAZOLE SODIUM SCH MG: 40 TABLET, DELAYED RELEASE ORAL at 19:19

## 2019-09-29 RX ADMIN — Medication SCH MMU: at 19:19

## 2019-09-29 RX ADMIN — Medication PRN MG: at 21:22

## 2019-09-29 RX ADMIN — MAGNESIUM HYDROXIDE PRN ML: 400 SUSPENSION ORAL at 16:47

## 2019-09-29 RX ADMIN — POTASSIUM CHLORIDE PRN MEQ: 1500 TABLET, EXTENDED RELEASE ORAL at 09:56

## 2019-09-29 RX ADMIN — PANTOPRAZOLE SODIUM SCH MG: 40 TABLET, DELAYED RELEASE ORAL at 10:07

## 2019-09-29 RX ADMIN — Medication SCH MG: at 09:55

## 2019-09-29 RX ADMIN — POTASSIUM CHLORIDE PRN MEQ: 1500 TABLET, EXTENDED RELEASE ORAL at 19:19

## 2019-09-29 RX ADMIN — INSULIN LISPRO SCH UNITS: 100 INJECTION, SOLUTION INTRAVENOUS; SUBCUTANEOUS at 10:10

## 2019-09-29 RX ADMIN — DOCUSATE SODIUM SCH MG: 100 CAPSULE, LIQUID FILLED ORAL at 09:55

## 2019-09-29 RX ADMIN — POLYETHYLENE GLYCOL 3350 SCH GM: 17 POWDER, FOR SOLUTION ORAL at 09:56

## 2019-09-29 NOTE — NUR
Patient in room PCU 3027. I have received report from  Gladis GONZALEZ  and had the opportunity 
to ask questions and assume patient care.

## 2019-09-29 NOTE — NUR
Problems reprioritized. Patient report given, questions answered & plan of care reviewed 
with LISA ZAMORA AND LISA FOY.

## 2019-09-29 NOTE — NUR
Patient in room PCU 3027. I have received report from CARY, LISA   and had the opportunity to 
ask questions and assume patient care. PT IN BED SLEEPING, AROUSABLE WHEN WOKEN, PTS WIFE AT 
BEDSIDE

## 2019-09-29 NOTE — NUR
paged dr addison 

PAGER ID:  [phone] 

MESSAGE:  luis noe [phone] ocn lay do you want k replacement? k 3.4 wife concerned 
about him being fatigued, he is alert and arousable when woken

-------------------------------------------------------------------------------

Addendum: 09/29/19 at 0823 by Luis Monsivais RN

-------------------------------------------------------------------------------

spoke with dr addison. he is aware of pts condition, rt eval and treat and k replacement orders 
received

## 2019-09-29 NOTE — NUR
PAGED DR ÁLVAREZ

PAGER ID:  [phone] 

MESSAGE:  GLADIS FANNY [phone] JALEN YOUNGER WIFE REQUESTING FEEDING TUBE DUE TO POOR INTAKE. 
GLUCERNA ADDED TODAY.

-------------------------------------------------------------------------------

Addendum: 09/29/19 at 1618 by Gladis Monsivais RN

-------------------------------------------------------------------------------

PER CONVERSATION WITH DR ÁLVAREZ HE DOES NOT WANT NG/FEEDING AT THIS TIME

-------------------------------------------------------------------------------

Addendum: 09/29/19 at 1635 by Gladis Monsivais RN

-------------------------------------------------------------------------------

PTS WIFE AWARE MD DOES NOT WANT FEEDING AT THIS TIME, ENC PO INTAKE

## 2019-09-29 NOTE — NUR
Problems reprioritized. Patient report given, questions answered & plan of care reviewed 
with Gladis ANNE RN.

## 2019-09-30 VITALS — SYSTOLIC BLOOD PRESSURE: 132 MMHG | DIASTOLIC BLOOD PRESSURE: 64 MMHG

## 2019-09-30 VITALS — DIASTOLIC BLOOD PRESSURE: 66 MMHG | SYSTOLIC BLOOD PRESSURE: 138 MMHG

## 2019-09-30 VITALS — SYSTOLIC BLOOD PRESSURE: 118 MMHG | DIASTOLIC BLOOD PRESSURE: 52 MMHG

## 2019-09-30 VITALS — DIASTOLIC BLOOD PRESSURE: 91 MMHG | SYSTOLIC BLOOD PRESSURE: 132 MMHG

## 2019-09-30 VITALS — DIASTOLIC BLOOD PRESSURE: 46 MMHG | SYSTOLIC BLOOD PRESSURE: 120 MMHG

## 2019-09-30 LAB
ALBUMIN SERPL BCP-MCNC: 1.5 G/DL (ref 3.4–5)
ALBUMIN/GLOB SERPL: 0.4 {RATIO} (ref 1.1–1.5)
ALP SERPL-CCNC: 66 IU/L (ref 46–116)
ALT SERPL W P-5'-P-CCNC: 28 U/L (ref 12–78)
ANION GAP SERPL CALCULATED.3IONS-SCNC: 4 MMOL/L (ref 8–16)
AST SERPL W P-5'-P-CCNC: 35 U/L (ref 10–37)
BASOPHILS # BLD AUTO: 0.1 X10'3 (ref 0–0.2)
BASOPHILS NFR BLD AUTO: 0.8 % (ref 0–1)
BILIRUB SERPL-MCNC: 0.3 MG/DL (ref 0.1–1)
BUN SERPL-MCNC: 15 MG/DL (ref 7–18)
BUN/CREAT SERPL: 11.9 (ref 5.4–32)
CALCIUM SERPL-MCNC: 8.6 MG/DL (ref 8.5–10.1)
CHLORIDE SERPL-SCNC: 106 MMOL/L (ref 99–107)
CO2 SERPL-SCNC: 32.3 MMOL/L (ref 24–32)
CREAT SERPL-MCNC: 1.26 MG/DL (ref 0.6–1.1)
EOSINOPHIL # BLD AUTO: 0.3 X10'3 (ref 0–0.9)
EOSINOPHIL NFR BLD AUTO: 4.5 % (ref 0–6)
ERYTHROCYTE [DISTWIDTH] IN BLOOD BY AUTOMATED COUNT: 15.4 % (ref 11.5–14.5)
ERYTHROCYTE [DISTWIDTH] IN BLOOD BY AUTOMATED COUNT: 15.4 % (ref 11.5–14.5)
GFR SERPL CREATININE-BSD FRML MDRD: 55 ML/MIN
GLUCOSE SERPL-MCNC: 198 MG/DL (ref 70–104)
HCT VFR BLD AUTO: 30 % (ref 42–52)
HCT VFR BLD AUTO: 33.9 % (ref 42–52)
HGB BLD-MCNC: 10.1 G/DL (ref 14–17.9)
HGB BLD-MCNC: 11.2 G/DL (ref 14–17.9)
LYMPHOCYTES # BLD AUTO: 1.3 X10'3 (ref 1.1–4.8)
LYMPHOCYTES NFR BLD AUTO: 19 % (ref 21–51)
MAGNESIUM SERPL-MCNC: 1.4 MG/DL (ref 1.5–2.4)
MCH RBC QN AUTO: 28.9 PG (ref 27–31)
MCH RBC QN AUTO: 29.2 PG (ref 27–31)
MCHC RBC AUTO-ENTMCNC: 32.9 G/DL (ref 33–36.5)
MCHC RBC AUTO-ENTMCNC: 33.7 G/DL (ref 33–36.5)
MCV RBC AUTO: 86.5 FL (ref 78–98)
MCV RBC AUTO: 87.6 FL (ref 78–98)
MONOCYTES # BLD AUTO: 0.9 X10'3 (ref 0–0.9)
MONOCYTES NFR BLD AUTO: 13.7 % (ref 2–12)
NEUTROPHILS # BLD AUTO: 4.1 X10'3 (ref 1.8–7.7)
NEUTROPHILS NFR BLD AUTO: 62 % (ref 42–75)
PLATELET # BLD AUTO: 247 X10'3 (ref 140–440)
PLATELET # BLD AUTO: 255 X10'3 (ref 140–440)
PMV BLD AUTO: 8.2 FL (ref 7.4–10.4)
PMV BLD AUTO: 8.2 FL (ref 7.4–10.4)
POTASSIUM SERPL-SCNC: 3.8 MMOL/L (ref 3.5–5.1)
PROT SERPL-MCNC: 5.6 G/DL (ref 6.4–8.2)
RBC # BLD AUTO: 3.47 X10'6 (ref 4.7–6.1)
RBC # BLD AUTO: 3.87 X10'6 (ref 4.7–6.1)
SODIUM SERPL-SCNC: 142 MMOL/L (ref 135–145)
WBC # BLD AUTO: 6.6 X10'3 (ref 4.5–11)
WBC # BLD AUTO: 8.3 X10'3 (ref 4.5–11)

## 2019-09-30 RX ADMIN — CARVEDILOL SCH MG: 6.25 TABLET, FILM COATED ORAL at 20:33

## 2019-09-30 RX ADMIN — Medication SCH CAN: at 08:00

## 2019-09-30 RX ADMIN — CARVEDILOL SCH MG: 6.25 TABLET, FILM COATED ORAL at 09:10

## 2019-09-30 RX ADMIN — INSULIN GLARGINE SCH UNIT: 100 INJECTION, SOLUTION SUBCUTANEOUS at 20:29

## 2019-09-30 RX ADMIN — MAGNESIUM HYDROXIDE PRN ML: 400 SUSPENSION ORAL at 20:34

## 2019-09-30 RX ADMIN — INSULIN LISPRO SCH UNITS: 100 INJECTION, SOLUTION INTRAVENOUS; SUBCUTANEOUS at 18:38

## 2019-09-30 RX ADMIN — Medication SCH MCG: at 08:00

## 2019-09-30 RX ADMIN — Medication PRN MG: at 20:33

## 2019-09-30 RX ADMIN — DIGOXIN SCH MCG: 0.25 TABLET ORAL at 09:13

## 2019-09-30 RX ADMIN — ONDANSETRON PRN MG: 2 INJECTION, SOLUTION INTRAMUSCULAR; INTRAVENOUS at 07:54

## 2019-09-30 RX ADMIN — Medication SCH CAN: at 12:58

## 2019-09-30 RX ADMIN — PANTOPRAZOLE SODIUM SCH MG: 40 TABLET, DELAYED RELEASE ORAL at 09:11

## 2019-09-30 RX ADMIN — PANTOPRAZOLE SODIUM SCH MG: 40 TABLET, DELAYED RELEASE ORAL at 20:33

## 2019-09-30 RX ADMIN — SACUBITRIL AND VALSARTAN SCH EACH: 24; 26 TABLET, FILM COATED ORAL at 20:31

## 2019-09-30 RX ADMIN — LEVOTHYROXINE SODIUM SCH MCG: 75 TABLET ORAL at 07:38

## 2019-09-30 RX ADMIN — DOCUSATE SODIUM SCH MG: 100 CAPSULE, LIQUID FILLED ORAL at 20:32

## 2019-09-30 RX ADMIN — SACUBITRIL AND VALSARTAN SCH EACH: 24; 26 TABLET, FILM COATED ORAL at 09:12

## 2019-09-30 RX ADMIN — POLYETHYLENE GLYCOL 3350 SCH GM: 17 POWDER, FOR SOLUTION ORAL at 09:06

## 2019-09-30 RX ADMIN — THERA TABS SCH EACH: TAB at 09:11

## 2019-09-30 RX ADMIN — INSULIN LISPRO SCH UNITS: 100 INJECTION, SOLUTION INTRAVENOUS; SUBCUTANEOUS at 13:38

## 2019-09-30 RX ADMIN — INSULIN LISPRO SCH UNITS: 100 INJECTION, SOLUTION INTRAVENOUS; SUBCUTANEOUS at 09:23

## 2019-09-30 RX ADMIN — Medication PRN MG: at 12:56

## 2019-09-30 RX ADMIN — Medication SCH MMU: at 20:32

## 2019-09-30 RX ADMIN — Medication SCH MMU: at 09:12

## 2019-09-30 RX ADMIN — Medication SCH MG: at 09:11

## 2019-09-30 RX ADMIN — DOCUSATE SODIUM SCH MG: 100 CAPSULE, LIQUID FILLED ORAL at 09:14

## 2019-09-30 RX ADMIN — Medication SCH CAN: at 18:00

## 2019-09-30 NOTE — NUR
Problems reprioritized. Patient report given, questions answered & plan of care reviewed 
with Natalia GONZALEZ.

## 2019-09-30 NOTE — NUR
Patient in room PCU 3027. I have received report from Natalia GONZALEZ   and had the opportunity to 
ask questions and assume patient care.

## 2019-09-30 NOTE — NUR
Patient in room PCU 3022C. I have received report from Leyda GONZALEZ   and had the opportunity 
to ask questions and assume patient care.

## 2019-09-30 NOTE — NUR
Problems reprioritized. Patient report given, questions answered & plan of care reviewed 
with Leyda GONZALEZ.

## 2019-10-01 VITALS — DIASTOLIC BLOOD PRESSURE: 61 MMHG | SYSTOLIC BLOOD PRESSURE: 124 MMHG

## 2019-10-01 VITALS — SYSTOLIC BLOOD PRESSURE: 136 MMHG | DIASTOLIC BLOOD PRESSURE: 62 MMHG

## 2019-10-01 VITALS — SYSTOLIC BLOOD PRESSURE: 117 MMHG | DIASTOLIC BLOOD PRESSURE: 42 MMHG

## 2019-10-01 VITALS — DIASTOLIC BLOOD PRESSURE: 60 MMHG | SYSTOLIC BLOOD PRESSURE: 123 MMHG

## 2019-10-01 VITALS — SYSTOLIC BLOOD PRESSURE: 139 MMHG | DIASTOLIC BLOOD PRESSURE: 62 MMHG

## 2019-10-01 VITALS — DIASTOLIC BLOOD PRESSURE: 61 MMHG | SYSTOLIC BLOOD PRESSURE: 141 MMHG

## 2019-10-01 LAB
ALBUMIN SERPL BCP-MCNC: 1.5 G/DL (ref 3.4–5)
ALBUMIN/GLOB SERPL: 0.4 {RATIO} (ref 1.1–1.5)
ALP SERPL-CCNC: 69 IU/L (ref 46–116)
ALT SERPL W P-5'-P-CCNC: 25 U/L (ref 12–78)
ANION GAP SERPL CALCULATED.3IONS-SCNC: 6 MMOL/L (ref 8–16)
AST SERPL W P-5'-P-CCNC: 28 U/L (ref 10–37)
BASOPHILS # BLD AUTO: 0.1 X10'3 (ref 0–0.2)
BASOPHILS NFR BLD AUTO: 0.8 % (ref 0–1)
BILIRUB SERPL-MCNC: 0.3 MG/DL (ref 0.1–1)
BUN SERPL-MCNC: 15 MG/DL (ref 7–18)
BUN/CREAT SERPL: 11.8 (ref 5.4–32)
CALCIUM SERPL-MCNC: 8.3 MG/DL (ref 8.5–10.1)
CHLORIDE SERPL-SCNC: 104 MMOL/L (ref 99–107)
CO2 SERPL-SCNC: 33.2 MMOL/L (ref 24–32)
CREAT SERPL-MCNC: 1.27 MG/DL (ref 0.6–1.1)
EOSINOPHIL # BLD AUTO: 0.5 X10'3 (ref 0–0.9)
EOSINOPHIL NFR BLD AUTO: 7.5 % (ref 0–6)
ERYTHROCYTE [DISTWIDTH] IN BLOOD BY AUTOMATED COUNT: 15.5 % (ref 11.5–14.5)
GFR SERPL CREATININE-BSD FRML MDRD: 54 ML/MIN
GLUCOSE SERPL-MCNC: 224 MG/DL (ref 70–104)
HCT VFR BLD AUTO: 31.2 % (ref 42–52)
HGB BLD-MCNC: 10.6 G/DL (ref 14–17.9)
LYMPHOCYTES # BLD AUTO: 1.3 X10'3 (ref 1.1–4.8)
LYMPHOCYTES NFR BLD AUTO: 19.8 % (ref 21–51)
MAGNESIUM SERPL-MCNC: 1.6 MG/DL (ref 1.5–2.4)
MCH RBC QN AUTO: 29.1 PG (ref 27–31)
MCHC RBC AUTO-ENTMCNC: 33.8 G/DL (ref 33–36.5)
MCV RBC AUTO: 86 FL (ref 78–98)
MONOCYTES # BLD AUTO: 0.8 X10'3 (ref 0–0.9)
MONOCYTES NFR BLD AUTO: 12.4 % (ref 2–12)
NEUTROPHILS # BLD AUTO: 4 X10'3 (ref 1.8–7.7)
NEUTROPHILS NFR BLD AUTO: 59.5 % (ref 42–75)
PLATELET # BLD AUTO: 249 X10'3 (ref 140–440)
PMV BLD AUTO: 8.5 FL (ref 7.4–10.4)
POTASSIUM SERPL-SCNC: 3.6 MMOL/L (ref 3.5–5.1)
PROT SERPL-MCNC: 5.5 G/DL (ref 6.4–8.2)
RBC # BLD AUTO: 3.63 X10'6 (ref 4.7–6.1)
SODIUM SERPL-SCNC: 143 MMOL/L (ref 135–145)
WBC # BLD AUTO: 6.7 X10'3 (ref 4.5–11)

## 2019-10-01 RX ADMIN — INSULIN LISPRO SCH UNITS: 100 INJECTION, SOLUTION INTRAVENOUS; SUBCUTANEOUS at 08:37

## 2019-10-01 RX ADMIN — CARVEDILOL SCH MG: 6.25 TABLET, FILM COATED ORAL at 21:11

## 2019-10-01 RX ADMIN — LEVOTHYROXINE SODIUM SCH MCG: 75 TABLET ORAL at 08:00

## 2019-10-01 RX ADMIN — Medication SCH MMU: at 09:21

## 2019-10-01 RX ADMIN — Medication SCH MMU: at 08:00

## 2019-10-01 RX ADMIN — PANTOPRAZOLE SODIUM SCH MG: 40 TABLET, DELAYED RELEASE ORAL at 21:04

## 2019-10-01 RX ADMIN — DIGOXIN SCH MCG: 0.25 TABLET ORAL at 09:20

## 2019-10-01 RX ADMIN — Medication SCH MCG: at 09:21

## 2019-10-01 RX ADMIN — PANTOPRAZOLE SODIUM SCH MG: 40 TABLET, DELAYED RELEASE ORAL at 09:20

## 2019-10-01 RX ADMIN — Medication SCH MG: at 09:19

## 2019-10-01 RX ADMIN — DOCUSATE SODIUM SCH MG: 100 CAPSULE, LIQUID FILLED ORAL at 09:18

## 2019-10-01 RX ADMIN — CARVEDILOL SCH MG: 6.25 TABLET, FILM COATED ORAL at 08:00

## 2019-10-01 RX ADMIN — Medication SCH CAN: at 08:08

## 2019-10-01 RX ADMIN — Medication SCH CAN: at 13:09

## 2019-10-01 RX ADMIN — DOCUSATE SODIUM SCH MG: 100 CAPSULE, LIQUID FILLED ORAL at 08:00

## 2019-10-01 RX ADMIN — POLYETHYLENE GLYCOL 3350 SCH GM: 17 POWDER, FOR SOLUTION ORAL at 09:18

## 2019-10-01 RX ADMIN — INSULIN LISPRO SCH UNITS: 100 INJECTION, SOLUTION INTRAVENOUS; SUBCUTANEOUS at 13:58

## 2019-10-01 RX ADMIN — THERA TABS SCH EACH: TAB at 08:00

## 2019-10-01 RX ADMIN — INSULIN GLARGINE SCH UNIT: 100 INJECTION, SOLUTION SUBCUTANEOUS at 21:27

## 2019-10-01 RX ADMIN — THERA TABS SCH EACH: TAB at 09:21

## 2019-10-01 RX ADMIN — Medication SCH CAN: at 21:03

## 2019-10-01 RX ADMIN — CARVEDILOL SCH MG: 6.25 TABLET, FILM COATED ORAL at 09:20

## 2019-10-01 RX ADMIN — PANTOPRAZOLE SODIUM SCH MG: 40 TABLET, DELAYED RELEASE ORAL at 08:00

## 2019-10-01 RX ADMIN — Medication SCH MCG: at 08:00

## 2019-10-01 RX ADMIN — DIGOXIN SCH MCG: 0.25 TABLET ORAL at 08:00

## 2019-10-01 RX ADMIN — MAGNESIUM HYDROXIDE PRN ML: 400 SUSPENSION ORAL at 21:11

## 2019-10-01 RX ADMIN — INSULIN LISPRO SCH UNITS: 100 INJECTION, SOLUTION INTRAVENOUS; SUBCUTANEOUS at 19:34

## 2019-10-01 RX ADMIN — Medication SCH MG: at 08:30

## 2019-10-01 RX ADMIN — SACUBITRIL AND VALSARTAN SCH EACH: 24; 26 TABLET, FILM COATED ORAL at 08:00

## 2019-10-01 RX ADMIN — SACUBITRIL AND VALSARTAN SCH EACH: 24; 26 TABLET, FILM COATED ORAL at 21:11

## 2019-10-01 RX ADMIN — POLYETHYLENE GLYCOL 3350 SCH GM: 17 POWDER, FOR SOLUTION ORAL at 08:00

## 2019-10-01 RX ADMIN — Medication SCH MMU: at 21:04

## 2019-10-01 RX ADMIN — DOCUSATE SODIUM SCH MG: 100 CAPSULE, LIQUID FILLED ORAL at 21:04

## 2019-10-01 NOTE — NUR
Paged Dr Rangel



PAGER ID: [phone]

MESSAGE: Natalia [phone]. RE DA Freedman [phone]. Pt is complaining of abd pain (No BM since 9/25) 
and does not want medication for it. Would you like any new orders? (wife requesting imaging 
if that is okay with you). Thank you!

## 2019-10-01 NOTE — NUR
Patient in room PCU 3028A. I have received report from  Leyda GONZALEZ  and had the opportunity 
to ask questions and assume patient care.

## 2019-10-01 NOTE — NUR
Nutrition consult re: "family requested nutrition consult". Patient and SO 

seen at bedside. Pt currently on a CHO controlled diet however SO 

requesting the addition of the heart healthy diet given pt with heart 

failure and recent episode of acute cholecystitis, MD notified and diet 

order has been adjusted. Pt now on heart healthy CHO controlled diet. Per 

SO pt has full dentures however is refusing to wear them at this time 

making it difficult for pt to eat, pt/SO agreeable to grind all, d/w 

dietary. Pt documented with 0-25% PO intake of meals and ONS not meeting 

nutrient needs. Glucerna at bedside at the time of visit with ~50% 

consumption. Noted that SO requesting appetite stimulant per RN notes. 

SO requested soup TID. All food preferences d/w dietary. Pt/SO provided 

with alternative CHO controlled write in menu to expand food options as 

well as ONS coupons and RD contact information. Pt admit with acute NSTEMI 

s/p cardiac catheterization and acute decompensated heart failure acute 

systolic heart failure, diuresing well with IV Lasix per MD notes. LBM 

9/25. Pt receiving routine Colace and MiraLax with PRN MoM last given 

9/30. Will continue to follow.

Recommendations:

1) Continue heart healthy CHO controlled diet

2) Continue Glucerna TID

3) Grind all TID; soup TID

4) Honor food preferences; encourage PO intake

5) Routine bowel care

6) Wt per rx

-------------------------------------------------------------------------------

Addendum: 10/01/19 at 1528 by Falguni Moran RD

-------------------------------------------------------------------------------

Amended: Links added.

## 2019-10-01 NOTE — NUR
Lab was able to access vein for lab draws, but blood would not draw all the way. 2 attempts 
made and wife refused further attempts. Let Dr Rangel know



PAGER ID:  [phone] 

MESSAGE:  Natalia glasgow [phone]. RE DA Freedman [phone]. Daughter is bedside and is asking to speak with 
you. Family is also requesting appetite stimulant medication if possible. Thank you

## 2019-10-01 NOTE — NUR
Paged Dr Rangel



PAGER ID:  [phone] 

MESSAGE:  Natalia glasgow [phone]. RE DA Freedman [id number]. Daughter is bedside and is asking to speak with 
you. Family is also requesting appetite stimulant medication if possible. Thank you

## 2019-10-02 VITALS — DIASTOLIC BLOOD PRESSURE: 75 MMHG | SYSTOLIC BLOOD PRESSURE: 145 MMHG

## 2019-10-02 VITALS — DIASTOLIC BLOOD PRESSURE: 62 MMHG | SYSTOLIC BLOOD PRESSURE: 139 MMHG

## 2019-10-02 VITALS — DIASTOLIC BLOOD PRESSURE: 63 MMHG | SYSTOLIC BLOOD PRESSURE: 131 MMHG

## 2019-10-02 VITALS — SYSTOLIC BLOOD PRESSURE: 137 MMHG | DIASTOLIC BLOOD PRESSURE: 60 MMHG

## 2019-10-02 LAB
ALBUMIN SERPL BCP-MCNC: 1.6 G/DL (ref 3.4–5)
ALBUMIN/GLOB SERPL: 0.4 {RATIO} (ref 1.1–1.5)
ALP SERPL-CCNC: 74 IU/L (ref 46–116)
ALT SERPL W P-5'-P-CCNC: 29 U/L (ref 12–78)
ANION GAP SERPL CALCULATED.3IONS-SCNC: 3 MMOL/L (ref 8–16)
AST SERPL W P-5'-P-CCNC: 30 U/L (ref 10–37)
BASOPHILS # BLD AUTO: 0 X10'3 (ref 0–0.2)
BASOPHILS NFR BLD AUTO: 0.7 % (ref 0–1)
BILIRUB SERPL-MCNC: 0.3 MG/DL (ref 0.1–1)
BUN SERPL-MCNC: 17 MG/DL (ref 7–18)
BUN/CREAT SERPL: 12.9 (ref 5.4–32)
CALCIUM SERPL-MCNC: 9.2 MG/DL (ref 8.5–10.1)
CHLORIDE SERPL-SCNC: 104 MMOL/L (ref 99–107)
CO2 SERPL-SCNC: 35.5 MMOL/L (ref 24–32)
CREAT SERPL-MCNC: 1.32 MG/DL (ref 0.6–1.1)
EOSINOPHIL # BLD AUTO: 0.6 X10'3 (ref 0–0.9)
EOSINOPHIL NFR BLD AUTO: 7.8 % (ref 0–6)
ERYTHROCYTE [DISTWIDTH] IN BLOOD BY AUTOMATED COUNT: 15.6 % (ref 11.5–14.5)
GFR SERPL CREATININE-BSD FRML MDRD: 52 ML/MIN
GLUCOSE SERPL-MCNC: 237 MG/DL (ref 70–104)
HCT VFR BLD AUTO: 33.3 % (ref 42–52)
HGB BLD-MCNC: 11.1 G/DL (ref 14–17.9)
LYMPHOCYTES # BLD AUTO: 1.4 X10'3 (ref 1.1–4.8)
LYMPHOCYTES NFR BLD AUTO: 19.2 % (ref 21–51)
MAGNESIUM SERPL-MCNC: 2 MG/DL (ref 1.5–2.4)
MCH RBC QN AUTO: 29 PG (ref 27–31)
MCHC RBC AUTO-ENTMCNC: 33.2 G/DL (ref 33–36.5)
MCV RBC AUTO: 87.6 FL (ref 78–98)
MONOCYTES # BLD AUTO: 0.8 X10'3 (ref 0–0.9)
MONOCYTES NFR BLD AUTO: 11 % (ref 2–12)
NEUTROPHILS # BLD AUTO: 4.4 X10'3 (ref 1.8–7.7)
NEUTROPHILS NFR BLD AUTO: 61.3 % (ref 42–75)
PLATELET # BLD AUTO: 260 X10'3 (ref 140–440)
PMV BLD AUTO: 8.4 FL (ref 7.4–10.4)
POTASSIUM SERPL-SCNC: 3.7 MMOL/L (ref 3.5–5.1)
PROT SERPL-MCNC: 5.6 G/DL (ref 6.4–8.2)
RBC # BLD AUTO: 3.81 X10'6 (ref 4.7–6.1)
SODIUM SERPL-SCNC: 142 MMOL/L (ref 135–145)
WBC # BLD AUTO: 7.2 X10'3 (ref 4.5–11)

## 2019-10-02 RX ADMIN — DIGOXIN SCH MCG: 0.25 TABLET ORAL at 07:46

## 2019-10-02 RX ADMIN — ONDANSETRON PRN MG: 2 INJECTION, SOLUTION INTRAMUSCULAR; INTRAVENOUS at 07:53

## 2019-10-02 RX ADMIN — INSULIN LISPRO SCH UNITS: 100 INJECTION, SOLUTION INTRAVENOUS; SUBCUTANEOUS at 09:48

## 2019-10-02 RX ADMIN — INSULIN LISPRO SCH UNITS: 100 INJECTION, SOLUTION INTRAVENOUS; SUBCUTANEOUS at 13:36

## 2019-10-02 RX ADMIN — Medication SCH CAN: at 08:00

## 2019-10-02 RX ADMIN — POLYETHYLENE GLYCOL 3350 SCH GM: 17 POWDER, FOR SOLUTION ORAL at 08:00

## 2019-10-02 RX ADMIN — THERA TABS SCH EACH: TAB at 07:40

## 2019-10-02 RX ADMIN — DOCUSATE SODIUM SCH MG: 100 CAPSULE, LIQUID FILLED ORAL at 07:47

## 2019-10-02 RX ADMIN — CARVEDILOL SCH MG: 6.25 TABLET, FILM COATED ORAL at 07:50

## 2019-10-02 RX ADMIN — Medication SCH MMU: at 07:49

## 2019-10-02 RX ADMIN — LEVOTHYROXINE SODIUM SCH MCG: 75 TABLET ORAL at 07:49

## 2019-10-02 RX ADMIN — PANTOPRAZOLE SODIUM SCH MG: 40 TABLET, DELAYED RELEASE ORAL at 07:39

## 2019-10-02 RX ADMIN — Medication SCH CAN: at 09:00

## 2019-10-02 RX ADMIN — SACUBITRIL AND VALSARTAN SCH EACH: 24; 26 TABLET, FILM COATED ORAL at 07:51

## 2019-10-02 RX ADMIN — Medication SCH MG: at 07:52

## 2019-10-02 RX ADMIN — Medication SCH MCG: at 07:51

## 2019-10-02 NOTE — NUR
Patient in room PCU 3027. I have received report from LISA Crabtree and had the opportunity to 
ask questions and assume patient care.  Patient is currently sleeping in bed, bed locked and 
low, call light in reach, no acute distress, will continue to monitor.

## 2019-10-02 NOTE — NUR
received order for patient tx to [facility], report called to Jitendra at facility, telemetry 
removed, patient sent via [employer], stable at time of discharge.

## 2021-07-09 ENCOUNTER — HOSPITAL ENCOUNTER (OUTPATIENT)
Dept: HOSPITAL 15 - CHF HDHVI | Age: 86
Discharge: HOME | End: 2021-07-09
Attending: INTERNAL MEDICINE
Payer: MEDICARE

## 2021-07-09 VITALS — DIASTOLIC BLOOD PRESSURE: 75 MMHG | SYSTOLIC BLOOD PRESSURE: 174 MMHG

## 2021-07-09 VITALS — SYSTOLIC BLOOD PRESSURE: 169 MMHG | DIASTOLIC BLOOD PRESSURE: 60 MMHG

## 2021-07-09 DIAGNOSIS — R30.0: ICD-10-CM

## 2021-07-09 DIAGNOSIS — R53.83: ICD-10-CM

## 2021-07-09 DIAGNOSIS — E87.6: ICD-10-CM

## 2021-07-09 DIAGNOSIS — I11.0: Primary | ICD-10-CM

## 2021-07-09 DIAGNOSIS — R00.2: ICD-10-CM

## 2021-07-09 DIAGNOSIS — C61: ICD-10-CM

## 2021-07-09 DIAGNOSIS — E11.9: ICD-10-CM

## 2021-07-09 DIAGNOSIS — D51.3: ICD-10-CM

## 2021-07-09 DIAGNOSIS — I50.23: ICD-10-CM

## 2021-07-09 DIAGNOSIS — E55.9: ICD-10-CM

## 2021-07-09 LAB
ALBUMIN SERPL-MCNC: 3 G/DL (ref 3.4–5)
ALP SERPL-CCNC: 118 U/L (ref 45–117)
ALT SERPL-CCNC: 30 U/L (ref 16–61)
ANION GAP SERPL CALCULATED.3IONS-SCNC: 4 MMOL/L (ref 5–15)
BILIRUB SERPL-MCNC: 0.4 MG/DL (ref 0.2–1)
BUN SERPL-MCNC: 27 MG/DL (ref 7–18)
BUN/CREAT SERPL: 28.7
CALCIUM SERPL-MCNC: 8.8 MG/DL (ref 8.5–10.1)
CHLORIDE SERPL-SCNC: 103 MMOL/L (ref 98–107)
CHOLEST SERPL-MCNC: 142 MG/DL (ref ?–200)
CO2 SERPL-SCNC: 29 MMOL/L (ref 21–32)
GLUCOSE SERPL-MCNC: 162 MG/DL (ref 74–106)
HCT VFR BLD AUTO: 35.9 % (ref 41–53)
HDLC SERPL-MCNC: 42 MG/DL (ref 40–59)
HGB BLD-MCNC: 12.3 G/DL (ref 13.5–17.5)
MCH RBC QN AUTO: 30.7 PG (ref 28–32)
MCV RBC AUTO: 89.5 FL (ref 80–100)
NRBC BLD QL AUTO: 0.1 %
POTASSIUM SERPL-SCNC: 4.6 MMOL/L (ref 3.5–5.1)
PROT SERPL-MCNC: 7.3 G/DL (ref 6.4–8.2)
PSA SERPL-MCNC: 0.05 NG/ML (ref 0–4)
SODIUM SERPL-SCNC: 136 MMOL/L (ref 136–145)
T4 FREE SERPL-MCNC: 1.34 NG/DL (ref 0.89–1.76)
TRIGL SERPL-MCNC: 102 MG/DL (ref ?–150)

## 2021-07-09 PROCEDURE — 83036 HEMOGLOBIN GLYCOSYLATED A1C: CPT

## 2021-07-09 PROCEDURE — 80053 COMPREHEN METABOLIC PANEL: CPT

## 2021-07-09 PROCEDURE — 36415 COLL VENOUS BLD VENIPUNCTURE: CPT

## 2021-07-09 PROCEDURE — 82306 VITAMIN D 25 HYDROXY: CPT

## 2021-07-09 PROCEDURE — 82607 VITAMIN B-12: CPT

## 2021-07-09 PROCEDURE — 80061 LIPID PANEL: CPT

## 2021-07-09 PROCEDURE — 84439 ASSAY OF FREE THYROXINE: CPT

## 2021-07-09 PROCEDURE — 84403 ASSAY OF TOTAL TESTOSTERONE: CPT

## 2021-07-09 PROCEDURE — 82962 GLUCOSE BLOOD TEST: CPT

## 2021-07-09 PROCEDURE — 84443 ASSAY THYROID STIM HORMONE: CPT

## 2021-07-09 PROCEDURE — 85049 AUTOMATED PLATELET COUNT: CPT

## 2021-07-09 PROCEDURE — 96374 THER/PROPH/DIAG INJ IV PUSH: CPT

## 2021-07-09 PROCEDURE — 85025 COMPLETE CBC W/AUTO DIFF WBC: CPT

## 2021-07-09 PROCEDURE — 84153 ASSAY OF PSA TOTAL: CPT

## 2021-07-14 ENCOUNTER — HOSPITAL ENCOUNTER (OUTPATIENT)
Dept: HOSPITAL 15 - RAD HDHVI | Age: 86
Discharge: HOME | End: 2021-07-14
Attending: INTERNAL MEDICINE
Payer: MEDICARE

## 2021-07-14 VITALS — SYSTOLIC BLOOD PRESSURE: 164 MMHG | DIASTOLIC BLOOD PRESSURE: 88 MMHG

## 2021-07-14 DIAGNOSIS — I50.23: ICD-10-CM

## 2021-07-14 DIAGNOSIS — R06.02: ICD-10-CM

## 2021-07-14 DIAGNOSIS — Z85.46: ICD-10-CM

## 2021-07-14 DIAGNOSIS — E11.9: ICD-10-CM

## 2021-07-14 DIAGNOSIS — E29.1: Primary | ICD-10-CM

## 2021-07-14 DIAGNOSIS — I11.0: ICD-10-CM

## 2021-07-14 DIAGNOSIS — R53.83: ICD-10-CM

## 2021-07-14 LAB
BUN SERPL-MCNC: 31 MG/DL (ref 7–18)
POTASSIUM SERPL-SCNC: 4.4 MMOL/L (ref 3.5–5.1)

## 2021-07-14 PROCEDURE — 84520 ASSAY OF UREA NITROGEN: CPT

## 2021-07-14 PROCEDURE — 82565 ASSAY OF CREATININE: CPT

## 2021-07-14 PROCEDURE — 36415 COLL VENOUS BLD VENIPUNCTURE: CPT

## 2021-07-14 PROCEDURE — 84132 ASSAY OF SERUM POTASSIUM: CPT

## 2021-07-14 PROCEDURE — 71046 X-RAY EXAM CHEST 2 VIEWS: CPT

## 2021-07-14 PROCEDURE — 83880 ASSAY OF NATRIURETIC PEPTIDE: CPT

## 2021-07-14 PROCEDURE — 93306 TTE W/DOPPLER COMPLETE: CPT

## 2021-07-14 PROCEDURE — 96372 THER/PROPH/DIAG INJ SC/IM: CPT

## 2021-07-28 ENCOUNTER — HOSPITAL ENCOUNTER (OUTPATIENT)
Dept: HOSPITAL 15 - RAD HDHVI | Age: 86
Discharge: HOME | End: 2021-07-28
Attending: INTERNAL MEDICINE
Payer: MEDICARE

## 2021-07-28 DIAGNOSIS — I50.23: Primary | ICD-10-CM

## 2021-07-28 LAB
ANION GAP SERPL CALCULATED.3IONS-SCNC: 3 MMOL/L (ref 5–15)
BUN SERPL-MCNC: 28 MG/DL (ref 7–18)
BUN/CREAT SERPL: 29.5
CALCIUM SERPL-MCNC: 8.8 MG/DL (ref 8.5–10.1)
CHLORIDE SERPL-SCNC: 103 MMOL/L (ref 98–107)
CO2 SERPL-SCNC: 31 MMOL/L (ref 21–32)
GLUCOSE SERPL-MCNC: 121 MG/DL (ref 74–106)
MAGNESIUM SERPL-MCNC: 2.3 MG/DL (ref 1.6–2.6)
POTASSIUM SERPL-SCNC: 5.2 MMOL/L (ref 3.5–5.1)
SODIUM SERPL-SCNC: 137 MMOL/L (ref 136–145)

## 2021-07-28 PROCEDURE — 80048 BASIC METABOLIC PNL TOTAL CA: CPT

## 2021-07-28 PROCEDURE — 36415 COLL VENOUS BLD VENIPUNCTURE: CPT

## 2021-07-28 PROCEDURE — 83735 ASSAY OF MAGNESIUM: CPT

## 2021-07-28 PROCEDURE — 83880 ASSAY OF NATRIURETIC PEPTIDE: CPT

## 2021-08-09 ENCOUNTER — HOSPITAL ENCOUNTER (OUTPATIENT)
Dept: HOSPITAL 15 - RAD HDHVI | Age: 86
Discharge: HOME | End: 2021-08-09
Attending: INTERNAL MEDICINE
Payer: MEDICARE

## 2021-08-09 VITALS — WEIGHT: 183 LBS | HEIGHT: 66 IN | BODY MASS INDEX: 29.41 KG/M2

## 2021-08-09 DIAGNOSIS — M43.16: ICD-10-CM

## 2021-08-09 DIAGNOSIS — E78.5: ICD-10-CM

## 2021-08-09 DIAGNOSIS — M25.78: ICD-10-CM

## 2021-08-09 DIAGNOSIS — I11.0: Primary | ICD-10-CM

## 2021-08-09 DIAGNOSIS — I50.43: ICD-10-CM

## 2021-08-09 DIAGNOSIS — E11.9: ICD-10-CM

## 2021-08-09 DIAGNOSIS — M48.061: ICD-10-CM

## 2021-08-09 DIAGNOSIS — Z95.1: ICD-10-CM

## 2021-08-09 DIAGNOSIS — M51.26: ICD-10-CM

## 2021-08-09 PROCEDURE — 96375 TX/PRO/DX INJ NEW DRUG ADDON: CPT

## 2021-08-09 PROCEDURE — 72131 CT LUMBAR SPINE W/O DYE: CPT

## 2021-08-09 PROCEDURE — 83880 ASSAY OF NATRIURETIC PEPTIDE: CPT

## 2021-08-09 PROCEDURE — 78452 HT MUSCLE IMAGE SPECT MULT: CPT

## 2021-08-09 PROCEDURE — 36415 COLL VENOUS BLD VENIPUNCTURE: CPT

## 2021-08-09 PROCEDURE — 93005 ELECTROCARDIOGRAM TRACING: CPT

## 2021-08-09 PROCEDURE — 84132 ASSAY OF SERUM POTASSIUM: CPT

## 2021-08-09 PROCEDURE — 96374 THER/PROPH/DIAG INJ IV PUSH: CPT

## 2021-12-21 ENCOUNTER — HOSPITAL ENCOUNTER (OUTPATIENT)
Dept: HOSPITAL 15 - CHF HDHVI | Age: 86
Discharge: HOME | End: 2021-12-21
Attending: INTERNAL MEDICINE
Payer: MEDICARE

## 2021-12-21 VITALS — SYSTOLIC BLOOD PRESSURE: 174 MMHG | DIASTOLIC BLOOD PRESSURE: 66 MMHG

## 2021-12-21 DIAGNOSIS — I70.0: ICD-10-CM

## 2021-12-21 DIAGNOSIS — I50.23: Primary | ICD-10-CM

## 2021-12-21 LAB
ALBUMIN SERPL-MCNC: 2.9 G/DL (ref 3.4–5)
ALP SERPL-CCNC: 155 U/L (ref 45–117)
ALT SERPL-CCNC: 24 U/L (ref 16–61)
ANION GAP SERPL CALCULATED.3IONS-SCNC: 4 MMOL/L (ref 5–15)
BILIRUB SERPL-MCNC: 0.4 MG/DL (ref 0.2–1)
BUN SERPL-MCNC: 26 MG/DL (ref 7–18)
BUN/CREAT SERPL: 28.6
CALCIUM SERPL-MCNC: 9.1 MG/DL (ref 8.5–10.1)
CHLORIDE SERPL-SCNC: 103 MMOL/L (ref 98–107)
CO2 SERPL-SCNC: 29 MMOL/L (ref 21–32)
GLUCOSE SERPL-MCNC: 156 MG/DL (ref 74–106)
POTASSIUM SERPL-SCNC: 4.7 MMOL/L (ref 3.5–5.1)
PROT SERPL-MCNC: 7.3 G/DL (ref 6.4–8.2)
SODIUM SERPL-SCNC: 136 MMOL/L (ref 136–145)

## 2021-12-21 PROCEDURE — 36415 COLL VENOUS BLD VENIPUNCTURE: CPT

## 2021-12-21 PROCEDURE — 96372 THER/PROPH/DIAG INJ SC/IM: CPT

## 2021-12-21 PROCEDURE — 93005 ELECTROCARDIOGRAM TRACING: CPT

## 2021-12-21 PROCEDURE — 80053 COMPREHEN METABOLIC PANEL: CPT

## 2021-12-21 PROCEDURE — 83735 ASSAY OF MAGNESIUM: CPT

## 2022-01-14 ENCOUNTER — HOSPITAL ENCOUNTER (OUTPATIENT)
Dept: HOSPITAL 15 - RAD HDHVI | Age: 87
Discharge: HOME | End: 2022-01-14
Attending: INTERNAL MEDICINE
Payer: MEDICARE

## 2022-01-14 DIAGNOSIS — R06.02: ICD-10-CM

## 2022-01-14 DIAGNOSIS — I51.7: Primary | ICD-10-CM

## 2022-01-14 PROCEDURE — 93306 TTE W/DOPPLER COMPLETE: CPT

## 2022-08-03 ENCOUNTER — HOSPITAL ENCOUNTER (OUTPATIENT)
Dept: HOSPITAL 15 - CHF HDHVI | Age: 87
Discharge: HOME | End: 2022-08-03
Attending: INTERNAL MEDICINE
Payer: MEDICARE

## 2022-08-03 VITALS — SYSTOLIC BLOOD PRESSURE: 150 MMHG | DIASTOLIC BLOOD PRESSURE: 65 MMHG

## 2022-08-03 DIAGNOSIS — E11.21: ICD-10-CM

## 2022-08-03 DIAGNOSIS — N40.0: ICD-10-CM

## 2022-08-03 DIAGNOSIS — E11.51: ICD-10-CM

## 2022-08-03 DIAGNOSIS — E29.1: ICD-10-CM

## 2022-08-03 DIAGNOSIS — Z86.16: ICD-10-CM

## 2022-08-03 DIAGNOSIS — I50.9: ICD-10-CM

## 2022-08-03 DIAGNOSIS — E11.42: ICD-10-CM

## 2022-08-03 DIAGNOSIS — D64.9: Primary | ICD-10-CM

## 2022-08-03 DIAGNOSIS — Z85.46: ICD-10-CM

## 2022-08-03 DIAGNOSIS — I11.0: ICD-10-CM

## 2022-08-03 DIAGNOSIS — Z79.4: ICD-10-CM

## 2022-08-03 DIAGNOSIS — E03.9: ICD-10-CM

## 2022-08-03 LAB
ANION GAP SERPL CALCULATED.3IONS-SCNC: 2 MMOL/L (ref 5–15)
BUN SERPL-MCNC: 24 MG/DL (ref 7–18)
BUN/CREAT SERPL: 24.7
CALCIUM SERPL-MCNC: 8.9 MG/DL (ref 8.5–10.1)
CHLORIDE SERPL-SCNC: 102 MMOL/L (ref 98–107)
CO2 SERPL-SCNC: 32 MMOL/L (ref 21–32)
GLUCOSE SERPL-MCNC: 203 MG/DL (ref 74–106)
HCT VFR BLD AUTO: 35.1 % (ref 41–53)
HGB BLD-MCNC: 11.2 G/DL (ref 13.5–17.5)
MCH RBC QN AUTO: 28.5 PG (ref 28–32)
MCV RBC AUTO: 88.9 FL (ref 80–100)
NRBC BLD QL AUTO: 0 %
POTASSIUM SERPL-SCNC: 5.1 MMOL/L (ref 3.5–5.1)
SODIUM SERPL-SCNC: 136 MMOL/L (ref 136–145)

## 2022-08-03 PROCEDURE — 36415 COLL VENOUS BLD VENIPUNCTURE: CPT

## 2022-08-03 PROCEDURE — 96365 THER/PROPH/DIAG IV INF INIT: CPT

## 2022-08-03 PROCEDURE — 85025 COMPLETE CBC W/AUTO DIFF WBC: CPT

## 2022-08-03 PROCEDURE — 96372 THER/PROPH/DIAG INJ SC/IM: CPT

## 2022-08-03 PROCEDURE — 80048 BASIC METABOLIC PNL TOTAL CA: CPT

## 2022-08-06 ENCOUNTER — HOSPITAL ENCOUNTER (INPATIENT)
Dept: HOSPITAL 15 - ER | Age: 87
LOS: 14 days | Discharge: HOME | DRG: 226 | End: 2022-08-20
Attending: INTERNAL MEDICINE
Payer: MEDICARE

## 2022-08-06 VITALS — SYSTOLIC BLOOD PRESSURE: 153 MMHG | DIASTOLIC BLOOD PRESSURE: 60 MMHG

## 2022-08-06 VITALS — SYSTOLIC BLOOD PRESSURE: 164 MMHG | DIASTOLIC BLOOD PRESSURE: 64 MMHG

## 2022-08-06 VITALS — SYSTOLIC BLOOD PRESSURE: 144 MMHG | DIASTOLIC BLOOD PRESSURE: 67 MMHG

## 2022-08-06 VITALS — DIASTOLIC BLOOD PRESSURE: 67 MMHG | SYSTOLIC BLOOD PRESSURE: 164 MMHG

## 2022-08-06 VITALS — DIASTOLIC BLOOD PRESSURE: 70 MMHG | SYSTOLIC BLOOD PRESSURE: 161 MMHG

## 2022-08-06 VITALS — SYSTOLIC BLOOD PRESSURE: 129 MMHG | DIASTOLIC BLOOD PRESSURE: 54 MMHG

## 2022-08-06 VITALS — BODY MASS INDEX: 28.65 KG/M2 | HEIGHT: 67 IN | WEIGHT: 182.54 LBS

## 2022-08-06 VITALS — SYSTOLIC BLOOD PRESSURE: 170 MMHG | DIASTOLIC BLOOD PRESSURE: 68 MMHG

## 2022-08-06 VITALS — SYSTOLIC BLOOD PRESSURE: 150 MMHG | DIASTOLIC BLOOD PRESSURE: 60 MMHG

## 2022-08-06 VITALS — SYSTOLIC BLOOD PRESSURE: 161 MMHG | DIASTOLIC BLOOD PRESSURE: 66 MMHG

## 2022-08-06 VITALS — DIASTOLIC BLOOD PRESSURE: 68 MMHG | SYSTOLIC BLOOD PRESSURE: 160 MMHG

## 2022-08-06 VITALS — SYSTOLIC BLOOD PRESSURE: 161 MMHG | DIASTOLIC BLOOD PRESSURE: 64 MMHG

## 2022-08-06 DIAGNOSIS — K44.9: ICD-10-CM

## 2022-08-06 DIAGNOSIS — Z90.79: ICD-10-CM

## 2022-08-06 DIAGNOSIS — K80.70: ICD-10-CM

## 2022-08-06 DIAGNOSIS — E44.0: ICD-10-CM

## 2022-08-06 DIAGNOSIS — Z86.73: ICD-10-CM

## 2022-08-06 DIAGNOSIS — K62.5: ICD-10-CM

## 2022-08-06 DIAGNOSIS — N30.91: ICD-10-CM

## 2022-08-06 DIAGNOSIS — K63.5: ICD-10-CM

## 2022-08-06 DIAGNOSIS — K64.4: ICD-10-CM

## 2022-08-06 DIAGNOSIS — K64.8: ICD-10-CM

## 2022-08-06 DIAGNOSIS — E03.9: ICD-10-CM

## 2022-08-06 DIAGNOSIS — R31.0: ICD-10-CM

## 2022-08-06 DIAGNOSIS — Z79.02: ICD-10-CM

## 2022-08-06 DIAGNOSIS — E11.65: ICD-10-CM

## 2022-08-06 DIAGNOSIS — K29.71: ICD-10-CM

## 2022-08-06 DIAGNOSIS — N40.0: ICD-10-CM

## 2022-08-06 DIAGNOSIS — J18.9: ICD-10-CM

## 2022-08-06 DIAGNOSIS — N35.919: ICD-10-CM

## 2022-08-06 DIAGNOSIS — I70.0: ICD-10-CM

## 2022-08-06 DIAGNOSIS — N28.9: ICD-10-CM

## 2022-08-06 DIAGNOSIS — I25.5: ICD-10-CM

## 2022-08-06 DIAGNOSIS — I50.21: ICD-10-CM

## 2022-08-06 DIAGNOSIS — K42.9: ICD-10-CM

## 2022-08-06 DIAGNOSIS — E87.5: ICD-10-CM

## 2022-08-06 DIAGNOSIS — M79.602: ICD-10-CM

## 2022-08-06 DIAGNOSIS — G89.29: ICD-10-CM

## 2022-08-06 DIAGNOSIS — E11.42: ICD-10-CM

## 2022-08-06 DIAGNOSIS — G47.33: ICD-10-CM

## 2022-08-06 DIAGNOSIS — Z83.79: ICD-10-CM

## 2022-08-06 DIAGNOSIS — I11.0: Primary | ICD-10-CM

## 2022-08-06 DIAGNOSIS — Z95.1: ICD-10-CM

## 2022-08-06 LAB
ALBUMIN SERPL-MCNC: 2.6 G/DL (ref 3.4–5)
ALP SERPL-CCNC: 110 U/L (ref 45–117)
ALT SERPL-CCNC: 20 U/L (ref 16–61)
ANION GAP SERPL CALCULATED.3IONS-SCNC: 2 MMOL/L (ref 5–15)
APTT PPP: 24.2 SEC (ref 24.6–33.4)
BILIRUB SERPL-MCNC: 0.3 MG/DL (ref 0.2–1)
BUN SERPL-MCNC: 29 MG/DL (ref 7–18)
BUN/CREAT SERPL: 26.6
CALCIUM SERPL-MCNC: 8.9 MG/DL (ref 8.5–10.1)
CHLORIDE SERPL-SCNC: 102 MMOL/L (ref 98–107)
CO2 SERPL-SCNC: 31 MMOL/L (ref 21–32)
GLUCOSE SERPL-MCNC: 179 MG/DL (ref 74–106)
HCT VFR BLD AUTO: 18.4 % (ref 41–53)
HCT VFR BLD AUTO: 39.2 % (ref 41–53)
HGB BLD-MCNC: 12.9 G/DL (ref 13.5–17.5)
HGB BLD-MCNC: 5.9 G/DL (ref 13.5–17.5)
INR PPP: 1.09 (ref 0.9–1.15)
MCH RBC QN AUTO: 28.7 PG (ref 28–32)
MCV RBC AUTO: 88.8 FL (ref 80–100)
NRBC BLD QL AUTO: 0.1 %
POTASSIUM SERPL-SCNC: 5.1 MMOL/L (ref 3.5–5.1)
PROT SERPL-MCNC: 6.6 G/DL (ref 6.4–8.2)
SODIUM SERPL-SCNC: 135 MMOL/L (ref 136–145)

## 2022-08-06 PROCEDURE — 99291 CRITICAL CARE FIRST HOUR: CPT

## 2022-08-06 PROCEDURE — 85730 THROMBOPLASTIN TIME PARTIAL: CPT

## 2022-08-06 PROCEDURE — 97110 THERAPEUTIC EXERCISES: CPT

## 2022-08-06 PROCEDURE — 82962 GLUCOSE BLOOD TEST: CPT

## 2022-08-06 PROCEDURE — 84484 ASSAY OF TROPONIN QUANT: CPT

## 2022-08-06 PROCEDURE — 86850 RBC ANTIBODY SCREEN: CPT

## 2022-08-06 PROCEDURE — 85014 HEMATOCRIT: CPT

## 2022-08-06 PROCEDURE — 71045 X-RAY EXAM CHEST 1 VIEW: CPT

## 2022-08-06 PROCEDURE — 86901 BLOOD TYPING SEROLOGIC RH(D): CPT

## 2022-08-06 PROCEDURE — 86920 COMPATIBILITY TEST SPIN: CPT

## 2022-08-06 PROCEDURE — 80053 COMPREHEN METABOLIC PANEL: CPT

## 2022-08-06 PROCEDURE — 84478 ASSAY OF TRIGLYCERIDES: CPT

## 2022-08-06 PROCEDURE — 80069 RENAL FUNCTION PANEL: CPT

## 2022-08-06 PROCEDURE — 99153 MOD SED SAME PHYS/QHP EA: CPT

## 2022-08-06 PROCEDURE — 33249 INSJ/RPLCMT DEFIB W/LEAD(S): CPT

## 2022-08-06 PROCEDURE — 87081 CULTURE SCREEN ONLY: CPT

## 2022-08-06 PROCEDURE — 85610 PROTHROMBIN TIME: CPT

## 2022-08-06 PROCEDURE — 96361 HYDRATE IV INFUSION ADD-ON: CPT

## 2022-08-06 PROCEDURE — 36415 COLL VENOUS BLD VENIPUNCTURE: CPT

## 2022-08-06 PROCEDURE — 33225 L VENTRIC PACING LEAD ADD-ON: CPT

## 2022-08-06 PROCEDURE — 86900 BLOOD TYPING SEROLOGIC ABO: CPT

## 2022-08-06 PROCEDURE — 97530 THERAPEUTIC ACTIVITIES: CPT

## 2022-08-06 PROCEDURE — 85025 COMPLETE CBC W/AUTO DIFF WBC: CPT

## 2022-08-06 PROCEDURE — 97116 GAIT TRAINING THERAPY: CPT

## 2022-08-06 PROCEDURE — 83880 ASSAY OF NATRIURETIC PEPTIDE: CPT

## 2022-08-06 PROCEDURE — B54MZZA ULTRASONOGRAPHY OF RIGHT UPPER EXTREMITY VEINS, GUIDANCE: ICD-10-PCS | Performed by: INTERNAL MEDICINE

## 2022-08-06 PROCEDURE — 85018 HEMOGLOBIN: CPT

## 2022-08-06 PROCEDURE — 30233N1 TRANSFUSION OF NONAUTOLOGOUS RED BLOOD CELLS INTO PERIPHERAL VEIN, PERCUTANEOUS APPROACH: ICD-10-PCS | Performed by: INTERNAL MEDICINE

## 2022-08-06 PROCEDURE — 30233P1 TRANSFUSION OF NONAUTOLOGOUS FROZEN RED CELLS INTO PERIPHERAL VEIN, PERCUTANEOUS APPROACH: ICD-10-PCS | Performed by: INTERNAL MEDICINE

## 2022-08-06 PROCEDURE — 83735 ASSAY OF MAGNESIUM: CPT

## 2022-08-06 PROCEDURE — 84100 ASSAY OF PHOSPHORUS: CPT

## 2022-08-06 PROCEDURE — 93005 ELECTROCARDIOGRAM TRACING: CPT

## 2022-08-06 PROCEDURE — 74176 CT ABD & PELVIS W/O CONTRAST: CPT

## 2022-08-06 PROCEDURE — 05H933Z INSERTION OF INFUSION DEVICE INTO RIGHT BRACHIAL VEIN, PERCUTANEOUS APPROACH: ICD-10-PCS | Performed by: INTERNAL MEDICINE

## 2022-08-06 PROCEDURE — 97163 PT EVAL HIGH COMPLEX 45 MIN: CPT

## 2022-08-06 PROCEDURE — 81001 URINALYSIS AUTO W/SCOPE: CPT

## 2022-08-06 PROCEDURE — 96365 THER/PROPH/DIAG IV INF INIT: CPT

## 2022-08-06 PROCEDURE — 99152 MOD SED SAME PHYS/QHP 5/>YRS: CPT

## 2022-08-06 RX ADMIN — Medication SCH STRIP: at 17:29

## 2022-08-06 RX ADMIN — PANTOPRAZOLE SODIUM SCH MG: 40 INJECTION, POWDER, FOR SOLUTION INTRAVENOUS at 22:55

## 2022-08-06 RX ADMIN — Medication SCH STRIP: at 22:55

## 2022-08-07 VITALS — DIASTOLIC BLOOD PRESSURE: 77 MMHG | SYSTOLIC BLOOD PRESSURE: 169 MMHG

## 2022-08-07 VITALS — SYSTOLIC BLOOD PRESSURE: 148 MMHG | DIASTOLIC BLOOD PRESSURE: 51 MMHG

## 2022-08-07 VITALS — SYSTOLIC BLOOD PRESSURE: 161 MMHG | DIASTOLIC BLOOD PRESSURE: 64 MMHG

## 2022-08-07 VITALS — SYSTOLIC BLOOD PRESSURE: 154 MMHG | DIASTOLIC BLOOD PRESSURE: 64 MMHG

## 2022-08-07 VITALS — DIASTOLIC BLOOD PRESSURE: 74 MMHG | SYSTOLIC BLOOD PRESSURE: 112 MMHG

## 2022-08-07 VITALS — SYSTOLIC BLOOD PRESSURE: 163 MMHG | DIASTOLIC BLOOD PRESSURE: 65 MMHG

## 2022-08-07 VITALS — SYSTOLIC BLOOD PRESSURE: 145 MMHG | DIASTOLIC BLOOD PRESSURE: 46 MMHG

## 2022-08-07 LAB
ALBUMIN SERPL-MCNC: 2.5 G/DL (ref 3.4–5)
ALP SERPL-CCNC: 85 U/L (ref 45–117)
ALT SERPL-CCNC: 22 U/L (ref 16–61)
ANION GAP SERPL CALCULATED.3IONS-SCNC: 4 MMOL/L (ref 5–15)
BILIRUB SERPL-MCNC: 0.3 MG/DL (ref 0.2–1)
BUN SERPL-MCNC: 21 MG/DL (ref 7–18)
BUN/CREAT SERPL: 25.6
CALCIUM SERPL-MCNC: 8.4 MG/DL (ref 8.5–10.1)
CHLORIDE SERPL-SCNC: 108 MMOL/L (ref 98–107)
CO2 SERPL-SCNC: 29 MMOL/L (ref 21–32)
GLUCOSE SERPL-MCNC: 73 MG/DL (ref 74–106)
HCT VFR BLD AUTO: 36.4 % (ref 41–53)
HCT VFR BLD AUTO: 38.4 % (ref 41–53)
HGB BLD-MCNC: 12.6 G/DL (ref 13.5–17.5)
HGB BLD-MCNC: 13 G/DL (ref 13.5–17.5)
MCH RBC QN AUTO: 31.2 PG (ref 28–32)
MCV RBC AUTO: 90.1 FL (ref 80–100)
NRBC BLD QL AUTO: 0.1 %
POTASSIUM SERPL-SCNC: 4.2 MMOL/L (ref 3.5–5.1)
PROT SERPL-MCNC: 6 G/DL (ref 6.4–8.2)
SODIUM SERPL-SCNC: 141 MMOL/L (ref 136–145)

## 2022-08-07 RX ADMIN — PANTOPRAZOLE SODIUM SCH MG: 40 INJECTION, POWDER, FOR SOLUTION INTRAVENOUS at 09:14

## 2022-08-07 RX ADMIN — Medication SCH STRIP: at 11:30

## 2022-08-07 RX ADMIN — PANTOPRAZOLE SODIUM SCH MG: 40 INJECTION, POWDER, FOR SOLUTION INTRAVENOUS at 21:45

## 2022-08-07 RX ADMIN — Medication SCH STRIP: at 06:27

## 2022-08-07 RX ADMIN — Medication SCH STRIP: at 21:45

## 2022-08-07 RX ADMIN — Medication SCH STRIP: at 17:12

## 2022-08-08 VITALS — DIASTOLIC BLOOD PRESSURE: 72 MMHG | SYSTOLIC BLOOD PRESSURE: 149 MMHG

## 2022-08-08 VITALS — SYSTOLIC BLOOD PRESSURE: 173 MMHG | DIASTOLIC BLOOD PRESSURE: 73 MMHG

## 2022-08-08 VITALS — SYSTOLIC BLOOD PRESSURE: 125 MMHG | DIASTOLIC BLOOD PRESSURE: 60 MMHG

## 2022-08-08 VITALS — DIASTOLIC BLOOD PRESSURE: 65 MMHG | SYSTOLIC BLOOD PRESSURE: 168 MMHG

## 2022-08-08 LAB
ALBUMIN SERPL-MCNC: 2.4 G/DL (ref 3.4–5)
ALP SERPL-CCNC: 84 U/L (ref 45–117)
ALT SERPL-CCNC: 20 U/L (ref 16–61)
ANION GAP SERPL CALCULATED.3IONS-SCNC: 6 MMOL/L (ref 5–15)
BILIRUB SERPL-MCNC: 0.4 MG/DL (ref 0.2–1)
BUN SERPL-MCNC: 15 MG/DL (ref 7–18)
BUN/CREAT SERPL: 19.5
CALCIUM SERPL-MCNC: 8.2 MG/DL (ref 8.5–10.1)
CHLORIDE SERPL-SCNC: 108 MMOL/L (ref 98–107)
CO2 SERPL-SCNC: 23 MMOL/L (ref 21–32)
GLUCOSE SERPL-MCNC: 105 MG/DL (ref 74–106)
HCT VFR BLD AUTO: 36.6 % (ref 41–53)
HGB BLD-MCNC: 12.8 G/DL (ref 13.5–17.5)
MCH RBC QN AUTO: 31.8 PG (ref 28–32)
MCV RBC AUTO: 90.8 FL (ref 80–100)
NRBC BLD QL AUTO: 0.1 %
POTASSIUM SERPL-SCNC: 4.4 MMOL/L (ref 3.5–5.1)
PROT SERPL-MCNC: 5.8 G/DL (ref 6.4–8.2)
SODIUM SERPL-SCNC: 137 MMOL/L (ref 136–145)

## 2022-08-08 RX ADMIN — PANTOPRAZOLE SODIUM SCH MG: 40 INJECTION, POWDER, FOR SOLUTION INTRAVENOUS at 22:48

## 2022-08-08 RX ADMIN — Medication SCH STRIP: at 16:41

## 2022-08-08 RX ADMIN — Medication SCH STRIP: at 06:09

## 2022-08-08 RX ADMIN — Medication SCH STRIP: at 22:48

## 2022-08-08 RX ADMIN — PANTOPRAZOLE SODIUM SCH MG: 40 INJECTION, POWDER, FOR SOLUTION INTRAVENOUS at 10:00

## 2022-08-08 RX ADMIN — Medication SCH STRIP: at 11:30

## 2022-08-09 VITALS — SYSTOLIC BLOOD PRESSURE: 150 MMHG | DIASTOLIC BLOOD PRESSURE: 67 MMHG

## 2022-08-09 VITALS — SYSTOLIC BLOOD PRESSURE: 182 MMHG | DIASTOLIC BLOOD PRESSURE: 72 MMHG

## 2022-08-09 VITALS — DIASTOLIC BLOOD PRESSURE: 63 MMHG | SYSTOLIC BLOOD PRESSURE: 125 MMHG

## 2022-08-09 VITALS — SYSTOLIC BLOOD PRESSURE: 174 MMHG | DIASTOLIC BLOOD PRESSURE: 72 MMHG

## 2022-08-09 VITALS — DIASTOLIC BLOOD PRESSURE: 63 MMHG | SYSTOLIC BLOOD PRESSURE: 120 MMHG

## 2022-08-09 VITALS — DIASTOLIC BLOOD PRESSURE: 59 MMHG | SYSTOLIC BLOOD PRESSURE: 114 MMHG

## 2022-08-09 RX ADMIN — Medication SCH ML: at 17:13

## 2022-08-09 RX ADMIN — Medication SCH ML: at 13:04

## 2022-08-09 RX ADMIN — Medication SCH STRIP: at 17:12

## 2022-08-09 RX ADMIN — LEUCINE, PHENYLALANINE, LYSINE, METHIONINE, ISOLEUCINE, VALINE, HISTIDINE, THREONINE, TRYPTOPHAN, ALANINE, GLYCINE, ARGININE, PROLINE, SERINE, TYROSINE, DEXTROSE NR MLS/HR: 311; 238; 247; 170; 255; 247; 204; 179; 77; 880; 438; 489; 289; 213; 17; 10 INJECTION INTRAVENOUS at 20:34

## 2022-08-09 RX ADMIN — Medication SCH STRIP: at 11:49

## 2022-08-09 RX ADMIN — Medication SCH STRIP: at 06:39

## 2022-08-09 RX ADMIN — Medication SCH STRIP: at 23:59

## 2022-08-09 RX ADMIN — PANTOPRAZOLE SODIUM SCH MG: 40 INJECTION, POWDER, FOR SOLUTION INTRAVENOUS at 22:35

## 2022-08-09 RX ADMIN — PANTOPRAZOLE SODIUM SCH MG: 40 INJECTION, POWDER, FOR SOLUTION INTRAVENOUS at 08:25

## 2022-08-09 RX ADMIN — SACUBITRIL AND VALSARTAN SCH TAB: 24; 26 TABLET, FILM COATED ORAL at 22:00

## 2022-08-10 VITALS — DIASTOLIC BLOOD PRESSURE: 62 MMHG | SYSTOLIC BLOOD PRESSURE: 161 MMHG

## 2022-08-10 VITALS — DIASTOLIC BLOOD PRESSURE: 62 MMHG | SYSTOLIC BLOOD PRESSURE: 134 MMHG

## 2022-08-10 VITALS — SYSTOLIC BLOOD PRESSURE: 163 MMHG | DIASTOLIC BLOOD PRESSURE: 65 MMHG

## 2022-08-10 VITALS — DIASTOLIC BLOOD PRESSURE: 49 MMHG | SYSTOLIC BLOOD PRESSURE: 157 MMHG

## 2022-08-10 VITALS — DIASTOLIC BLOOD PRESSURE: 68 MMHG | SYSTOLIC BLOOD PRESSURE: 167 MMHG

## 2022-08-10 LAB
ALBUMIN SERPL-MCNC: 2.3 G/DL (ref 3.4–5)
ALP SERPL-CCNC: 78 U/L (ref 45–117)
ALT SERPL-CCNC: 20 U/L (ref 16–61)
ANION GAP SERPL CALCULATED.3IONS-SCNC: 5 MMOL/L (ref 5–15)
BILIRUB SERPL-MCNC: 0.5 MG/DL (ref 0.2–1)
BUN SERPL-MCNC: 16 MG/DL (ref 7–18)
BUN/CREAT SERPL: 18.4
CALCIUM SERPL-MCNC: 8.4 MG/DL (ref 8.5–10.1)
CHLORIDE SERPL-SCNC: 107 MMOL/L (ref 98–107)
CO2 SERPL-SCNC: 26 MMOL/L (ref 21–32)
GLUCOSE SERPL-MCNC: 220 MG/DL (ref 74–106)
MAGNESIUM SERPL-MCNC: 1.9 MG/DL (ref 1.6–2.6)
POTASSIUM SERPL-SCNC: 3.4 MMOL/L (ref 3.5–5.1)
PROT SERPL-MCNC: 5.8 G/DL (ref 6.4–8.2)
SODIUM SERPL-SCNC: 138 MMOL/L (ref 136–145)
TRIGL SERPL-MCNC: 70 MG/DL (ref ?–150)

## 2022-08-10 PROCEDURE — 0DB98ZX EXCISION OF DUODENUM, VIA NATURAL OR ARTIFICIAL OPENING ENDOSCOPIC, DIAGNOSTIC: ICD-10-PCS | Performed by: INTERNAL MEDICINE

## 2022-08-10 PROCEDURE — 0DB68ZX EXCISION OF STOMACH, VIA NATURAL OR ARTIFICIAL OPENING ENDOSCOPIC, DIAGNOSTIC: ICD-10-PCS | Performed by: INTERNAL MEDICINE

## 2022-08-10 PROCEDURE — 0DBL8ZZ EXCISION OF TRANSVERSE COLON, VIA NATURAL OR ARTIFICIAL OPENING ENDOSCOPIC: ICD-10-PCS | Performed by: INTERNAL MEDICINE

## 2022-08-10 RX ADMIN — Medication SCH STRIP: at 05:30

## 2022-08-10 RX ADMIN — Medication SCH ML: at 11:25

## 2022-08-10 RX ADMIN — SACUBITRIL AND VALSARTAN SCH TAB: 24; 26 TABLET, FILM COATED ORAL at 07:56

## 2022-08-10 RX ADMIN — Medication SCH STRIP: at 11:37

## 2022-08-10 RX ADMIN — Medication SCH STRIP: at 23:29

## 2022-08-10 RX ADMIN — Medication SCH STRIP: at 16:47

## 2022-08-10 RX ADMIN — HUMAN INSULIN SCH UNITS: 100 INJECTION, SOLUTION SUBCUTANEOUS at 00:00

## 2022-08-10 RX ADMIN — HUMAN INSULIN SCH UNITS: 100 INJECTION, SOLUTION SUBCUTANEOUS at 23:32

## 2022-08-10 RX ADMIN — Medication SCH ML: at 16:46

## 2022-08-10 RX ADMIN — SACUBITRIL AND VALSARTAN SCH TAB: 24; 26 TABLET, FILM COATED ORAL at 21:59

## 2022-08-10 RX ADMIN — PANTOPRAZOLE SODIUM SCH MG: 40 INJECTION, POWDER, FOR SOLUTION INTRAVENOUS at 21:59

## 2022-08-10 RX ADMIN — HUMAN INSULIN SCH UNITS: 100 INJECTION, SOLUTION SUBCUTANEOUS at 11:25

## 2022-08-10 RX ADMIN — LEUCINE, PHENYLALANINE, LYSINE, METHIONINE, ISOLEUCINE, VALINE, HISTIDINE, THREONINE, TRYPTOPHAN, ALANINE, GLYCINE, ARGININE, PROLINE, SERINE, TYROSINE, DEXTROSE NR MLS/HR: 311; 238; 247; 170; 255; 247; 204; 179; 77; 880; 438; 489; 289; 213; 17; 10 INJECTION INTRAVENOUS at 20:32

## 2022-08-10 RX ADMIN — HUMAN INSULIN SCH UNITS: 100 INJECTION, SOLUTION SUBCUTANEOUS at 06:00

## 2022-08-10 RX ADMIN — PANTOPRAZOLE SODIUM SCH MG: 40 INJECTION, POWDER, FOR SOLUTION INTRAVENOUS at 08:23

## 2022-08-10 RX ADMIN — Medication SCH ML: at 07:56

## 2022-08-10 RX ADMIN — HUMAN INSULIN SCH UNITS: 100 INJECTION, SOLUTION SUBCUTANEOUS at 16:46

## 2022-08-11 VITALS — SYSTOLIC BLOOD PRESSURE: 178 MMHG | DIASTOLIC BLOOD PRESSURE: 69 MMHG

## 2022-08-11 VITALS — DIASTOLIC BLOOD PRESSURE: 60 MMHG | SYSTOLIC BLOOD PRESSURE: 160 MMHG

## 2022-08-11 VITALS — SYSTOLIC BLOOD PRESSURE: 169 MMHG | DIASTOLIC BLOOD PRESSURE: 41 MMHG

## 2022-08-11 VITALS — DIASTOLIC BLOOD PRESSURE: 86 MMHG | SYSTOLIC BLOOD PRESSURE: 187 MMHG

## 2022-08-11 VITALS — DIASTOLIC BLOOD PRESSURE: 79 MMHG | SYSTOLIC BLOOD PRESSURE: 111 MMHG

## 2022-08-11 LAB
ALBUMIN SERPL-MCNC: 1.9 G/DL (ref 3.4–5)
ALP SERPL-CCNC: 69 U/L (ref 45–117)
ALT SERPL-CCNC: 16 U/L (ref 16–61)
ANION GAP SERPL CALCULATED.3IONS-SCNC: 4 MMOL/L (ref 5–15)
BILIRUB SERPL-MCNC: 0.3 MG/DL (ref 0.2–1)
BUN SERPL-MCNC: 14 MG/DL (ref 7–18)
BUN/CREAT SERPL: 17.9
CALCIUM SERPL-MCNC: 7.7 MG/DL (ref 8.5–10.1)
CHLORIDE SERPL-SCNC: 111 MMOL/L (ref 98–107)
CO2 SERPL-SCNC: 24 MMOL/L (ref 21–32)
GLUCOSE SERPL-MCNC: 206 MG/DL (ref 74–106)
MAGNESIUM SERPL-MCNC: 1.9 MG/DL (ref 1.6–2.6)
POTASSIUM SERPL-SCNC: 3.3 MMOL/L (ref 3.5–5.1)
PROT SERPL-MCNC: 5.3 G/DL (ref 6.4–8.2)
SODIUM SERPL-SCNC: 139 MMOL/L (ref 136–145)

## 2022-08-11 PROCEDURE — 0T9D8ZZ DRAINAGE OF URETHRA, VIA NATURAL OR ARTIFICIAL OPENING ENDOSCOPIC: ICD-10-PCS | Performed by: UROLOGY

## 2022-08-11 RX ADMIN — HUMAN INSULIN SCH UNITS: 100 INJECTION, SOLUTION SUBCUTANEOUS at 11:50

## 2022-08-11 RX ADMIN — HUMAN INSULIN SCH UNITS: 100 INJECTION, SOLUTION SUBCUTANEOUS at 16:27

## 2022-08-11 RX ADMIN — SACUBITRIL AND VALSARTAN SCH TAB: 24; 26 TABLET, FILM COATED ORAL at 07:54

## 2022-08-11 RX ADMIN — Medication SCH ML: at 16:27

## 2022-08-11 RX ADMIN — Medication SCH ML: at 10:02

## 2022-08-11 RX ADMIN — LEUCINE, PHENYLALANINE, LYSINE, METHIONINE, ISOLEUCINE, VALINE, HISTIDINE, THREONINE, TRYPTOPHAN, ALANINE, GLYCINE, ARGININE, PROLINE, SERINE, TYROSINE, DEXTROSE NR MLS/HR: 311; 238; 247; 170; 255; 247; 204; 179; 77; 880; 438; 489; 289; 213; 17; 10 INJECTION INTRAVENOUS at 19:49

## 2022-08-11 RX ADMIN — LEUCINE, PHENYLALANINE, LYSINE, METHIONINE, ISOLEUCINE, VALINE, HISTIDINE, THREONINE, TRYPTOPHAN, ALANINE, GLYCINE, ARGININE, PROLINE, SERINE, TYROSINE, DEXTROSE SCH MLS/HR: 311; 238; 247; 170; 255; 247; 204; 179; 77; 880; 438; 489; 289; 213; 17; 10 INJECTION INTRAVENOUS at 20:53

## 2022-08-11 RX ADMIN — SACUBITRIL AND VALSARTAN SCH TAB: 24; 26 TABLET, FILM COATED ORAL at 22:57

## 2022-08-11 RX ADMIN — PANTOPRAZOLE SODIUM SCH MG: 40 INJECTION, POWDER, FOR SOLUTION INTRAVENOUS at 07:54

## 2022-08-11 RX ADMIN — Medication SCH STRIP: at 16:27

## 2022-08-11 RX ADMIN — Medication SCH STRIP: at 08:45

## 2022-08-11 RX ADMIN — POTASSIUM CHLORIDE SCH MLS/HR: 200 INJECTION, SOLUTION INTRAVENOUS at 10:01

## 2022-08-11 RX ADMIN — Medication SCH ML: at 07:54

## 2022-08-11 RX ADMIN — CARVEDILOL SCH MG: 3.12 TABLET, FILM COATED ORAL at 22:00

## 2022-08-11 RX ADMIN — HUMAN INSULIN SCH UNITS: 100 INJECTION, SOLUTION SUBCUTANEOUS at 06:10

## 2022-08-11 RX ADMIN — GABAPENTIN SCH MG: 100 CAPSULE ORAL at 22:57

## 2022-08-11 RX ADMIN — Medication SCH STRIP: at 05:46

## 2022-08-11 RX ADMIN — PANTOPRAZOLE SODIUM SCH MG: 40 INJECTION, POWDER, FOR SOLUTION INTRAVENOUS at 22:37

## 2022-08-11 RX ADMIN — ATORVASTATIN CALCIUM SCH MG: 20 TABLET, FILM COATED ORAL at 22:56

## 2022-08-11 RX ADMIN — POTASSIUM CHLORIDE SCH MLS/HR: 200 INJECTION, SOLUTION INTRAVENOUS at 15:13

## 2022-08-12 VITALS — SYSTOLIC BLOOD PRESSURE: 173 MMHG | DIASTOLIC BLOOD PRESSURE: 68 MMHG

## 2022-08-12 VITALS — DIASTOLIC BLOOD PRESSURE: 69 MMHG | SYSTOLIC BLOOD PRESSURE: 172 MMHG

## 2022-08-12 VITALS — SYSTOLIC BLOOD PRESSURE: 150 MMHG | DIASTOLIC BLOOD PRESSURE: 67 MMHG

## 2022-08-12 VITALS — SYSTOLIC BLOOD PRESSURE: 163 MMHG | DIASTOLIC BLOOD PRESSURE: 69 MMHG

## 2022-08-12 VITALS — DIASTOLIC BLOOD PRESSURE: 66 MMHG | SYSTOLIC BLOOD PRESSURE: 172 MMHG

## 2022-08-12 LAB
ALBUMIN SERPL-MCNC: 2 G/DL (ref 3.4–5)
ANION GAP SERPL CALCULATED.3IONS-SCNC: 6 MMOL/L (ref 5–15)
BUN SERPL-MCNC: 15 MG/DL (ref 7–18)
BUN/CREAT SERPL: 18.1
CALCIUM SERPL-MCNC: 8 MG/DL (ref 8.5–10.1)
CHLORIDE SERPL-SCNC: 106 MMOL/L (ref 98–107)
CO2 SERPL-SCNC: 22 MMOL/L (ref 21–32)
GLUCOSE SERPL-MCNC: 350 MG/DL (ref 74–106)
MAGNESIUM SERPL-MCNC: 1.7 MG/DL (ref 1.6–2.6)
POTASSIUM SERPL-SCNC: 3.6 MMOL/L (ref 3.5–5.1)
SODIUM SERPL-SCNC: 134 MMOL/L (ref 136–145)

## 2022-08-12 RX ADMIN — Medication SCH STRIP: at 00:18

## 2022-08-12 RX ADMIN — Medication SCH STRIP: at 12:09

## 2022-08-12 RX ADMIN — LEUCINE, PHENYLALANINE, LYSINE, METHIONINE, ISOLEUCINE, VALINE, HISTIDINE, THREONINE, TRYPTOPHAN, ALANINE, GLYCINE, ARGININE, PROLINE, SERINE, TYROSINE, DEXTROSE SCH MLS/HR: 311; 238; 247; 170; 255; 247; 204; 179; 77; 880; 438; 489; 289; 213; 17; 10 INJECTION INTRAVENOUS at 20:08

## 2022-08-12 RX ADMIN — POTASSIUM CHLORIDE SCH MLS/HR: 200 INJECTION, SOLUTION INTRAVENOUS at 20:07

## 2022-08-12 RX ADMIN — PANTOPRAZOLE SODIUM SCH MG: 40 INJECTION, POWDER, FOR SOLUTION INTRAVENOUS at 10:21

## 2022-08-12 RX ADMIN — GABAPENTIN SCH MG: 100 CAPSULE ORAL at 06:12

## 2022-08-12 RX ADMIN — HUMAN INSULIN SCH UNITS: 100 INJECTION, SOLUTION SUBCUTANEOUS at 12:10

## 2022-08-12 RX ADMIN — Medication SCH ML: at 17:29

## 2022-08-12 RX ADMIN — HUMAN INSULIN SCH UNITS: 100 INJECTION, SOLUTION SUBCUTANEOUS at 18:08

## 2022-08-12 RX ADMIN — POTASSIUM CHLORIDE SCH MLS/HR: 200 INJECTION, SOLUTION INTRAVENOUS at 17:05

## 2022-08-12 RX ADMIN — PANTOPRAZOLE SODIUM SCH MG: 40 INJECTION, POWDER, FOR SOLUTION INTRAVENOUS at 21:40

## 2022-08-12 RX ADMIN — Medication SCH STRIP: at 06:13

## 2022-08-12 RX ADMIN — ATORVASTATIN CALCIUM SCH MG: 20 TABLET, FILM COATED ORAL at 21:41

## 2022-08-12 RX ADMIN — CARVEDILOL SCH MG: 3.12 TABLET, FILM COATED ORAL at 10:00

## 2022-08-12 RX ADMIN — Medication SCH STRIP: at 17:29

## 2022-08-12 RX ADMIN — SACUBITRIL AND VALSARTAN SCH TAB: 24; 26 TABLET, FILM COATED ORAL at 10:21

## 2022-08-12 RX ADMIN — LEVOTHYROXINE SODIUM SCH MCG: 50 TABLET ORAL at 07:00

## 2022-08-12 RX ADMIN — HUMAN INSULIN SCH UNITS: 100 INJECTION, SOLUTION SUBCUTANEOUS at 06:42

## 2022-08-12 RX ADMIN — GABAPENTIN SCH MG: 100 CAPSULE ORAL at 15:18

## 2022-08-12 RX ADMIN — Medication SCH ML: at 08:00

## 2022-08-12 RX ADMIN — HUMAN INSULIN SCH UNITS: 100 INJECTION, SOLUTION SUBCUTANEOUS at 00:20

## 2022-08-12 RX ADMIN — GABAPENTIN SCH MG: 100 CAPSULE ORAL at 21:41

## 2022-08-12 RX ADMIN — Medication SCH ML: at 11:49

## 2022-08-12 RX ADMIN — SACUBITRIL AND VALSARTAN SCH TAB: 24; 26 TABLET, FILM COATED ORAL at 21:41

## 2022-08-13 VITALS — DIASTOLIC BLOOD PRESSURE: 67 MMHG | SYSTOLIC BLOOD PRESSURE: 139 MMHG

## 2022-08-13 VITALS — SYSTOLIC BLOOD PRESSURE: 143 MMHG | DIASTOLIC BLOOD PRESSURE: 63 MMHG

## 2022-08-13 VITALS — SYSTOLIC BLOOD PRESSURE: 111 MMHG | DIASTOLIC BLOOD PRESSURE: 52 MMHG

## 2022-08-13 VITALS — SYSTOLIC BLOOD PRESSURE: 125 MMHG | DIASTOLIC BLOOD PRESSURE: 47 MMHG

## 2022-08-13 VITALS — DIASTOLIC BLOOD PRESSURE: 62 MMHG | SYSTOLIC BLOOD PRESSURE: 143 MMHG

## 2022-08-13 VITALS — DIASTOLIC BLOOD PRESSURE: 71 MMHG | SYSTOLIC BLOOD PRESSURE: 161 MMHG

## 2022-08-13 LAB
ALBUMIN SERPL-MCNC: 2.1 G/DL (ref 3.4–5)
ANION GAP SERPL CALCULATED.3IONS-SCNC: 2 MMOL/L (ref 5–15)
BUN SERPL-MCNC: 20 MG/DL (ref 7–18)
BUN/CREAT SERPL: 27.8
CALCIUM SERPL-MCNC: 8.2 MG/DL (ref 8.5–10.1)
CHLORIDE SERPL-SCNC: 107 MMOL/L (ref 98–107)
CO2 SERPL-SCNC: 28 MMOL/L (ref 21–32)
GLUCOSE SERPL-MCNC: 190 MG/DL (ref 74–106)
HCT VFR BLD AUTO: 43 % (ref 41–53)
HGB BLD-MCNC: 13.8 G/DL (ref 13.5–17.5)
MAGNESIUM SERPL-MCNC: 1.5 MG/DL (ref 1.6–2.6)
MCH RBC QN AUTO: 28.8 PG (ref 28–32)
MCV RBC AUTO: 89.5 FL (ref 80–100)
NRBC BLD QL AUTO: 0 %
POTASSIUM SERPL-SCNC: 4.3 MMOL/L (ref 3.5–5.1)
SODIUM SERPL-SCNC: 137 MMOL/L (ref 136–145)

## 2022-08-13 RX ADMIN — Medication SCH STRIP: at 00:00

## 2022-08-13 RX ADMIN — GABAPENTIN SCH MG: 100 CAPSULE ORAL at 14:00

## 2022-08-13 RX ADMIN — GABAPENTIN SCH MG: 100 CAPSULE ORAL at 06:48

## 2022-08-13 RX ADMIN — HUMAN INSULIN SCH UNITS: 100 INJECTION, SOLUTION SUBCUTANEOUS at 01:03

## 2022-08-13 RX ADMIN — Medication SCH ML: at 08:17

## 2022-08-13 RX ADMIN — PANTOPRAZOLE SODIUM SCH MG: 40 INJECTION, POWDER, FOR SOLUTION INTRAVENOUS at 10:00

## 2022-08-13 RX ADMIN — Medication SCH STRIP: at 06:00

## 2022-08-13 RX ADMIN — Medication SCH ML: at 12:29

## 2022-08-13 RX ADMIN — HUMAN INSULIN SCH UNITS: 100 INJECTION, SOLUTION SUBCUTANEOUS at 17:54

## 2022-08-13 RX ADMIN — POTASSIUM CHLORIDE SCH MLS/HR: 200 INJECTION, SOLUTION INTRAVENOUS at 00:23

## 2022-08-13 RX ADMIN — ATORVASTATIN CALCIUM SCH MG: 20 TABLET, FILM COATED ORAL at 23:00

## 2022-08-13 RX ADMIN — MAGNESIUM SULFATE IN DEXTROSE SCH MLS/HR: 10 INJECTION, SOLUTION INTRAVENOUS at 11:09

## 2022-08-13 RX ADMIN — HUMAN INSULIN SCH UNITS: 100 INJECTION, SOLUTION SUBCUTANEOUS at 12:00

## 2022-08-13 RX ADMIN — HUMAN INSULIN SCH UNITS: 100 INJECTION, SOLUTION SUBCUTANEOUS at 06:43

## 2022-08-13 RX ADMIN — Medication SCH STRIP: at 12:29

## 2022-08-13 RX ADMIN — FUROSEMIDE SCH MG: 10 INJECTION, SOLUTION INTRAMUSCULAR; INTRAVENOUS at 19:30

## 2022-08-13 RX ADMIN — APIXABAN SCH MG: 2.5 TABLET, FILM COATED ORAL at 22:59

## 2022-08-13 RX ADMIN — LEUCINE, PHENYLALANINE, LYSINE, METHIONINE, ISOLEUCINE, VALINE, HISTIDINE, THREONINE, TRYPTOPHAN, ALANINE, GLYCINE, ARGININE, PROLINE, SERINE, TYROSINE, DEXTROSE NR MLS/HR: 311; 238; 247; 170; 255; 247; 204; 179; 77; 880; 438; 489; 289; 213; 17; 10 INJECTION INTRAVENOUS at 20:00

## 2022-08-13 RX ADMIN — POTASSIUM CHLORIDE SCH MLS/HR: 200 INJECTION, SOLUTION INTRAVENOUS at 20:03

## 2022-08-13 RX ADMIN — LEVOTHYROXINE SODIUM SCH MCG: 50 TABLET ORAL at 06:49

## 2022-08-13 RX ADMIN — Medication SCH ML: at 17:53

## 2022-08-13 RX ADMIN — GABAPENTIN SCH MG: 100 CAPSULE ORAL at 23:00

## 2022-08-13 RX ADMIN — MAGNESIUM SULFATE IN DEXTROSE SCH MLS/HR: 10 INJECTION, SOLUTION INTRAVENOUS at 12:30

## 2022-08-13 RX ADMIN — SACUBITRIL AND VALSARTAN SCH TAB: 24; 26 TABLET, FILM COATED ORAL at 10:25

## 2022-08-13 RX ADMIN — PANTOPRAZOLE SODIUM SCH MG: 40 INJECTION, POWDER, FOR SOLUTION INTRAVENOUS at 22:58

## 2022-08-13 RX ADMIN — SACUBITRIL AND VALSARTAN SCH TAB: 24; 26 TABLET, FILM COATED ORAL at 22:59

## 2022-08-13 RX ADMIN — Medication SCH STRIP: at 17:56

## 2022-08-13 RX ADMIN — LEUCINE, PHENYLALANINE, LYSINE, METHIONINE, ISOLEUCINE, VALINE, HISTIDINE, THREONINE, TRYPTOPHAN, ALANINE, GLYCINE, ARGININE, PROLINE, SERINE, TYROSINE, DEXTROSE SCH MLS/HR: 311; 238; 247; 170; 255; 247; 204; 179; 77; 880; 438; 489; 289; 213; 17; 10 INJECTION INTRAVENOUS at 19:38

## 2022-08-14 VITALS — DIASTOLIC BLOOD PRESSURE: 55 MMHG | SYSTOLIC BLOOD PRESSURE: 137 MMHG

## 2022-08-14 VITALS — DIASTOLIC BLOOD PRESSURE: 62 MMHG | SYSTOLIC BLOOD PRESSURE: 107 MMHG

## 2022-08-14 VITALS — SYSTOLIC BLOOD PRESSURE: 141 MMHG | DIASTOLIC BLOOD PRESSURE: 57 MMHG

## 2022-08-14 VITALS — DIASTOLIC BLOOD PRESSURE: 63 MMHG | SYSTOLIC BLOOD PRESSURE: 143 MMHG

## 2022-08-14 VITALS — DIASTOLIC BLOOD PRESSURE: 61 MMHG | SYSTOLIC BLOOD PRESSURE: 136 MMHG

## 2022-08-14 VITALS — SYSTOLIC BLOOD PRESSURE: 151 MMHG | DIASTOLIC BLOOD PRESSURE: 66 MMHG

## 2022-08-14 LAB
ALBUMIN SERPL-MCNC: 2.1 G/DL (ref 3.4–5)
ALP SERPL-CCNC: 75 U/L (ref 45–117)
ALT SERPL-CCNC: 22 U/L (ref 16–61)
ANION GAP SERPL CALCULATED.3IONS-SCNC: 4 MMOL/L (ref 5–15)
BILIRUB SERPL-MCNC: 0.3 MG/DL (ref 0.2–1)
BUN SERPL-MCNC: 22 MG/DL (ref 7–18)
BUN/CREAT SERPL: 28.9
CALCIUM SERPL-MCNC: 8 MG/DL (ref 8.5–10.1)
CHLORIDE SERPL-SCNC: 109 MMOL/L (ref 98–107)
CO2 SERPL-SCNC: 24 MMOL/L (ref 21–32)
GLUCOSE SERPL-MCNC: 141 MG/DL (ref 74–106)
MAGNESIUM SERPL-MCNC: 2.1 MG/DL (ref 1.6–2.6)
POTASSIUM SERPL-SCNC: 4.2 MMOL/L (ref 3.5–5.1)
PROT SERPL-MCNC: 5.5 G/DL (ref 6.4–8.2)
SODIUM SERPL-SCNC: 137 MMOL/L (ref 136–145)

## 2022-08-14 RX ADMIN — PANTOPRAZOLE SODIUM SCH MG: 40 INJECTION, POWDER, FOR SOLUTION INTRAVENOUS at 22:04

## 2022-08-14 RX ADMIN — FUROSEMIDE SCH MG: 10 INJECTION, SOLUTION INTRAMUSCULAR; INTRAVENOUS at 11:18

## 2022-08-14 RX ADMIN — HUMAN INSULIN SCH UNITS: 100 INJECTION, SOLUTION SUBCUTANEOUS at 17:42

## 2022-08-14 RX ADMIN — Medication SCH STRIP: at 12:08

## 2022-08-14 RX ADMIN — ATORVASTATIN CALCIUM SCH MG: 20 TABLET, FILM COATED ORAL at 22:04

## 2022-08-14 RX ADMIN — HUMAN INSULIN SCH UNITS: 100 INJECTION, SOLUTION SUBCUTANEOUS at 12:16

## 2022-08-14 RX ADMIN — Medication SCH STRIP: at 18:11

## 2022-08-14 RX ADMIN — GABAPENTIN SCH MG: 100 CAPSULE ORAL at 22:05

## 2022-08-14 RX ADMIN — Medication SCH STRIP: at 00:09

## 2022-08-14 RX ADMIN — LEUCINE, PHENYLALANINE, LYSINE, METHIONINE, ISOLEUCINE, VALINE, HISTIDINE, THREONINE, TRYPTOPHAN, ALANINE, GLYCINE, ARGININE, PROLINE, SERINE, TYROSINE, DEXTROSE NR MLS/HR: 311; 238; 247; 170; 255; 247; 204; 179; 77; 880; 438; 489; 289; 213; 17; 10 INJECTION INTRAVENOUS at 22:05

## 2022-08-14 RX ADMIN — SACUBITRIL AND VALSARTAN SCH TAB: 24; 26 TABLET, FILM COATED ORAL at 11:17

## 2022-08-14 RX ADMIN — Medication SCH ML: at 12:08

## 2022-08-14 RX ADMIN — HUMAN INSULIN SCH UNITS: 100 INJECTION, SOLUTION SUBCUTANEOUS at 05:24

## 2022-08-14 RX ADMIN — LEUCINE, PHENYLALANINE, LYSINE, METHIONINE, ISOLEUCINE, VALINE, HISTIDINE, THREONINE, TRYPTOPHAN, ALANINE, GLYCINE, ARGININE, PROLINE, SERINE, TYROSINE, DEXTROSE NR MLS/HR: 311; 238; 247; 170; 255; 247; 204; 179; 77; 880; 438; 489; 289; 213; 17; 10 INJECTION INTRAVENOUS at 18:11

## 2022-08-14 RX ADMIN — GABAPENTIN SCH MG: 100 CAPSULE ORAL at 07:19

## 2022-08-14 RX ADMIN — LEVOTHYROXINE SODIUM SCH MCG: 50 TABLET ORAL at 07:19

## 2022-08-14 RX ADMIN — GABAPENTIN SCH MG: 100 CAPSULE ORAL at 14:21

## 2022-08-14 RX ADMIN — HUMAN INSULIN SCH UNITS: 100 INJECTION, SOLUTION SUBCUTANEOUS at 00:09

## 2022-08-14 RX ADMIN — APIXABAN SCH MG: 2.5 TABLET, FILM COATED ORAL at 22:04

## 2022-08-14 RX ADMIN — Medication SCH ML: at 08:00

## 2022-08-14 RX ADMIN — Medication SCH STRIP: at 05:25

## 2022-08-14 RX ADMIN — PANTOPRAZOLE SODIUM SCH MG: 40 INJECTION, POWDER, FOR SOLUTION INTRAVENOUS at 11:17

## 2022-08-14 RX ADMIN — POTASSIUM CHLORIDE SCH MLS/HR: 200 INJECTION, SOLUTION INTRAVENOUS at 00:27

## 2022-08-14 RX ADMIN — SACUBITRIL AND VALSARTAN SCH TAB: 24; 26 TABLET, FILM COATED ORAL at 22:04

## 2022-08-14 RX ADMIN — Medication SCH ML: at 17:39

## 2022-08-14 RX ADMIN — APIXABAN SCH MG: 2.5 TABLET, FILM COATED ORAL at 10:00

## 2022-08-15 VITALS — SYSTOLIC BLOOD PRESSURE: 142 MMHG | DIASTOLIC BLOOD PRESSURE: 59 MMHG

## 2022-08-15 VITALS — SYSTOLIC BLOOD PRESSURE: 150 MMHG | DIASTOLIC BLOOD PRESSURE: 77 MMHG

## 2022-08-15 VITALS — SYSTOLIC BLOOD PRESSURE: 137 MMHG | DIASTOLIC BLOOD PRESSURE: 58 MMHG

## 2022-08-15 VITALS — SYSTOLIC BLOOD PRESSURE: 122 MMHG | DIASTOLIC BLOOD PRESSURE: 64 MMHG

## 2022-08-15 VITALS — SYSTOLIC BLOOD PRESSURE: 138 MMHG | DIASTOLIC BLOOD PRESSURE: 51 MMHG

## 2022-08-15 RX ADMIN — LEUCINE, PHENYLALANINE, LYSINE, METHIONINE, ISOLEUCINE, VALINE, HISTIDINE, THREONINE, TRYPTOPHAN, ALANINE, GLYCINE, ARGININE, PROLINE, SERINE, TYROSINE, DEXTROSE NR MLS/HR: 311; 238; 247; 170; 255; 247; 204; 179; 77; 880; 438; 489; 289; 213; 17; 10 INJECTION INTRAVENOUS at 23:18

## 2022-08-15 RX ADMIN — PANTOPRAZOLE SODIUM SCH MG: 40 INJECTION, POWDER, FOR SOLUTION INTRAVENOUS at 23:03

## 2022-08-15 RX ADMIN — Medication SCH ML: at 18:00

## 2022-08-15 RX ADMIN — Medication SCH ML: at 12:00

## 2022-08-15 RX ADMIN — FUROSEMIDE SCH MG: 10 INJECTION, SOLUTION INTRAMUSCULAR; INTRAVENOUS at 10:09

## 2022-08-15 RX ADMIN — Medication SCH STRIP: at 06:10

## 2022-08-15 RX ADMIN — ATORVASTATIN CALCIUM SCH MG: 20 TABLET, FILM COATED ORAL at 23:04

## 2022-08-15 RX ADMIN — GABAPENTIN SCH MG: 100 CAPSULE ORAL at 14:00

## 2022-08-15 RX ADMIN — SACUBITRIL AND VALSARTAN SCH TAB: 24; 26 TABLET, FILM COATED ORAL at 23:03

## 2022-08-15 RX ADMIN — GABAPENTIN SCH MG: 100 CAPSULE ORAL at 23:03

## 2022-08-15 RX ADMIN — Medication SCH ML: at 08:24

## 2022-08-15 RX ADMIN — SACUBITRIL AND VALSARTAN SCH TAB: 24; 26 TABLET, FILM COATED ORAL at 10:09

## 2022-08-15 RX ADMIN — APIXABAN SCH MG: 2.5 TABLET, FILM COATED ORAL at 23:04

## 2022-08-15 RX ADMIN — Medication SCH STRIP: at 00:35

## 2022-08-15 RX ADMIN — HUMAN INSULIN SCH UNITS: 100 INJECTION, SOLUTION SUBCUTANEOUS at 18:00

## 2022-08-15 RX ADMIN — HUMAN INSULIN SCH UNITS: 100 INJECTION, SOLUTION SUBCUTANEOUS at 06:39

## 2022-08-15 RX ADMIN — LEUCINE, PHENYLALANINE, LYSINE, METHIONINE, ISOLEUCINE, VALINE, HISTIDINE, THREONINE, TRYPTOPHAN, ALANINE, GLYCINE, ARGININE, PROLINE, SERINE, TYROSINE, DEXTROSE NR MLS/HR: 311; 238; 247; 170; 255; 247; 204; 179; 77; 880; 438; 489; 289; 213; 17; 10 INJECTION INTRAVENOUS at 23:17

## 2022-08-15 RX ADMIN — APIXABAN SCH MG: 2.5 TABLET, FILM COATED ORAL at 10:09

## 2022-08-15 RX ADMIN — GABAPENTIN SCH MG: 100 CAPSULE ORAL at 06:10

## 2022-08-15 RX ADMIN — LEUCINE, PHENYLALANINE, LYSINE, METHIONINE, ISOLEUCINE, VALINE, HISTIDINE, THREONINE, TRYPTOPHAN, ALANINE, GLYCINE, ARGININE, PROLINE, SERINE, TYROSINE, DEXTROSE NR MLS/HR: 311; 238; 247; 170; 255; 247; 204; 179; 77; 880; 438; 489; 289; 213; 17; 10 INJECTION INTRAVENOUS at 23:04

## 2022-08-15 RX ADMIN — Medication SCH STRIP: at 12:00

## 2022-08-15 RX ADMIN — HUMAN INSULIN SCH UNITS: 100 INJECTION, SOLUTION SUBCUTANEOUS at 12:00

## 2022-08-15 RX ADMIN — PANTOPRAZOLE SODIUM SCH MG: 40 INJECTION, POWDER, FOR SOLUTION INTRAVENOUS at 10:09

## 2022-08-15 RX ADMIN — HUMAN INSULIN SCH UNITS: 100 INJECTION, SOLUTION SUBCUTANEOUS at 00:39

## 2022-08-15 RX ADMIN — LEVOTHYROXINE SODIUM SCH MCG: 50 TABLET ORAL at 06:10

## 2022-08-15 RX ADMIN — Medication SCH STRIP: at 18:00

## 2022-08-16 VITALS — DIASTOLIC BLOOD PRESSURE: 114 MMHG | SYSTOLIC BLOOD PRESSURE: 181 MMHG

## 2022-08-16 VITALS — DIASTOLIC BLOOD PRESSURE: 89 MMHG | SYSTOLIC BLOOD PRESSURE: 166 MMHG

## 2022-08-16 VITALS — DIASTOLIC BLOOD PRESSURE: 56 MMHG | SYSTOLIC BLOOD PRESSURE: 144 MMHG

## 2022-08-16 VITALS — SYSTOLIC BLOOD PRESSURE: 172 MMHG | DIASTOLIC BLOOD PRESSURE: 96 MMHG

## 2022-08-16 VITALS — SYSTOLIC BLOOD PRESSURE: 135 MMHG | DIASTOLIC BLOOD PRESSURE: 66 MMHG

## 2022-08-16 VITALS — SYSTOLIC BLOOD PRESSURE: 176 MMHG | DIASTOLIC BLOOD PRESSURE: 78 MMHG

## 2022-08-16 VITALS — DIASTOLIC BLOOD PRESSURE: 109 MMHG | SYSTOLIC BLOOD PRESSURE: 186 MMHG

## 2022-08-16 VITALS — DIASTOLIC BLOOD PRESSURE: 68 MMHG | SYSTOLIC BLOOD PRESSURE: 149 MMHG

## 2022-08-16 VITALS — DIASTOLIC BLOOD PRESSURE: 88 MMHG | SYSTOLIC BLOOD PRESSURE: 178 MMHG

## 2022-08-16 VITALS — DIASTOLIC BLOOD PRESSURE: 94 MMHG | SYSTOLIC BLOOD PRESSURE: 179 MMHG

## 2022-08-16 LAB
ALBUMIN SERPL-MCNC: 2.2 G/DL (ref 3.4–5)
ALBUMIN SERPL-MCNC: 2.3 G/DL (ref 3.4–5)
ALP SERPL-CCNC: 67 U/L (ref 45–117)
ALP SERPL-CCNC: 73 U/L (ref 45–117)
ALT SERPL-CCNC: 17 U/L (ref 16–61)
ALT SERPL-CCNC: 19 U/L (ref 16–61)
ANION GAP SERPL CALCULATED.3IONS-SCNC: 4 MMOL/L (ref 5–15)
ANION GAP SERPL CALCULATED.3IONS-SCNC: 4 MMOL/L (ref 5–15)
BILIRUB SERPL-MCNC: 0.5 MG/DL (ref 0.2–1)
BILIRUB SERPL-MCNC: 0.6 MG/DL (ref 0.2–1)
BUN SERPL-MCNC: 22 MG/DL (ref 7–18)
BUN SERPL-MCNC: 22 MG/DL (ref 7–18)
BUN/CREAT SERPL: 24.7
BUN/CREAT SERPL: 25.3
CALCIUM SERPL-MCNC: 8.4 MG/DL (ref 8.5–10.1)
CALCIUM SERPL-MCNC: 8.5 MG/DL (ref 8.5–10.1)
CHLORIDE SERPL-SCNC: 108 MMOL/L (ref 98–107)
CHLORIDE SERPL-SCNC: 108 MMOL/L (ref 98–107)
CO2 SERPL-SCNC: 26 MMOL/L (ref 21–32)
CO2 SERPL-SCNC: 27 MMOL/L (ref 21–32)
GLUCOSE SERPL-MCNC: 236 MG/DL (ref 74–106)
GLUCOSE SERPL-MCNC: 266 MG/DL (ref 74–106)
MAGNESIUM SERPL-MCNC: 1.8 MG/DL (ref 1.6–2.6)
MAGNESIUM SERPL-MCNC: 2 MG/DL (ref 1.6–2.6)
POTASSIUM SERPL-SCNC: 3.8 MMOL/L (ref 3.5–5.1)
POTASSIUM SERPL-SCNC: 4 MMOL/L (ref 3.5–5.1)
PROT SERPL-MCNC: 5.8 G/DL (ref 6.4–8.2)
PROT SERPL-MCNC: 6 G/DL (ref 6.4–8.2)
SODIUM SERPL-SCNC: 138 MMOL/L (ref 136–145)
SODIUM SERPL-SCNC: 139 MMOL/L (ref 136–145)

## 2022-08-16 PROCEDURE — 02H63KZ INSERTION OF DEFIBRILLATOR LEAD INTO RIGHT ATRIUM, PERCUTANEOUS APPROACH: ICD-10-PCS | Performed by: INTERNAL MEDICINE

## 2022-08-16 PROCEDURE — 02HL3KZ INSERTION OF DEFIBRILLATOR LEAD INTO LEFT VENTRICLE, PERCUTANEOUS APPROACH: ICD-10-PCS | Performed by: INTERNAL MEDICINE

## 2022-08-16 PROCEDURE — 02H43KZ INSERTION OF DEFIBRILLATOR LEAD INTO CORONARY VEIN, PERCUTANEOUS APPROACH: ICD-10-PCS | Performed by: INTERNAL MEDICINE

## 2022-08-16 PROCEDURE — 0JH609Z INSERTION OF CARDIAC RESYNCHRONIZATION DEFIBRILLATOR PULSE GENERATOR INTO CHEST SUBCUTANEOUS TISSUE AND FASCIA, OPEN APPROACH: ICD-10-PCS | Performed by: INTERNAL MEDICINE

## 2022-08-16 PROCEDURE — 02HK3KZ INSERTION OF DEFIBRILLATOR LEAD INTO RIGHT VENTRICLE, PERCUTANEOUS APPROACH: ICD-10-PCS | Performed by: INTERNAL MEDICINE

## 2022-08-16 RX ADMIN — HUMAN INSULIN SCH UNITS: 100 INJECTION, SOLUTION SUBCUTANEOUS at 18:00

## 2022-08-16 RX ADMIN — PANTOPRAZOLE SODIUM SCH MG: 40 INJECTION, POWDER, FOR SOLUTION INTRAVENOUS at 10:00

## 2022-08-16 RX ADMIN — Medication SCH ML: at 18:00

## 2022-08-16 RX ADMIN — Medication SCH STRIP: at 05:40

## 2022-08-16 RX ADMIN — HUMAN INSULIN SCH UNITS: 100 INJECTION, SOLUTION SUBCUTANEOUS at 00:15

## 2022-08-16 RX ADMIN — ATORVASTATIN CALCIUM SCH MG: 20 TABLET, FILM COATED ORAL at 21:44

## 2022-08-16 RX ADMIN — GABAPENTIN SCH MG: 100 CAPSULE ORAL at 05:30

## 2022-08-16 RX ADMIN — LEUCINE, PHENYLALANINE, LYSINE, METHIONINE, ISOLEUCINE, VALINE, HISTIDINE, THREONINE, TRYPTOPHAN, ALANINE, GLYCINE, ARGININE, PROLINE, SERINE, TYROSINE, DEXTROSE NR MLS/HR: 311; 238; 247; 170; 255; 247; 204; 179; 77; 880; 438; 489; 289; 213; 17; 10 INJECTION INTRAVENOUS at 20:14

## 2022-08-16 RX ADMIN — LEVOTHYROXINE SODIUM SCH MCG: 50 TABLET ORAL at 06:15

## 2022-08-16 RX ADMIN — APIXABAN SCH MG: 2.5 TABLET, FILM COATED ORAL at 21:28

## 2022-08-16 RX ADMIN — Medication SCH STRIP: at 00:14

## 2022-08-16 RX ADMIN — Medication SCH ML: at 12:00

## 2022-08-16 RX ADMIN — Medication SCH STRIP: at 12:00

## 2022-08-16 RX ADMIN — HUMAN INSULIN SCH UNITS: 100 INJECTION, SOLUTION SUBCUTANEOUS at 05:40

## 2022-08-16 RX ADMIN — APIXABAN SCH MG: 2.5 TABLET, FILM COATED ORAL at 10:00

## 2022-08-16 RX ADMIN — PANTOPRAZOLE SODIUM SCH MG: 40 INJECTION, POWDER, FOR SOLUTION INTRAVENOUS at 21:28

## 2022-08-16 RX ADMIN — HUMAN INSULIN SCH UNITS: 100 INJECTION, SOLUTION SUBCUTANEOUS at 12:00

## 2022-08-16 RX ADMIN — FUROSEMIDE SCH MG: 10 INJECTION, SOLUTION INTRAMUSCULAR; INTRAVENOUS at 10:00

## 2022-08-16 RX ADMIN — Medication SCH ML: at 08:00

## 2022-08-16 RX ADMIN — Medication SCH STRIP: at 18:00

## 2022-08-16 RX ADMIN — CARVEDILOL SCH MG: 12.5 TABLET, FILM COATED ORAL at 19:55

## 2022-08-16 RX ADMIN — GABAPENTIN SCH MG: 100 CAPSULE ORAL at 21:44

## 2022-08-16 RX ADMIN — CEFAZOLIN SODIUM SCH MLS/HR: 1 INJECTION, SOLUTION INTRAVENOUS at 19:00

## 2022-08-16 RX ADMIN — GABAPENTIN SCH MG: 100 CAPSULE ORAL at 14:00

## 2022-08-16 RX ADMIN — SACUBITRIL AND VALSARTAN SCH TAB: 24; 26 TABLET, FILM COATED ORAL at 21:28

## 2022-08-16 RX ADMIN — SACUBITRIL AND VALSARTAN SCH TAB: 24; 26 TABLET, FILM COATED ORAL at 10:00

## 2022-08-16 RX ADMIN — LEUCINE, PHENYLALANINE, LYSINE, METHIONINE, ISOLEUCINE, VALINE, HISTIDINE, THREONINE, TRYPTOPHAN, ALANINE, GLYCINE, ARGININE, PROLINE, SERINE, TYROSINE, DEXTROSE SCH MLS/HR: 311; 238; 247; 170; 255; 247; 204; 179; 77; 880; 438; 489; 289; 213; 17; 10 INJECTION INTRAVENOUS at 19:53

## 2022-08-17 VITALS — DIASTOLIC BLOOD PRESSURE: 53 MMHG | SYSTOLIC BLOOD PRESSURE: 106 MMHG

## 2022-08-17 VITALS — DIASTOLIC BLOOD PRESSURE: 63 MMHG | SYSTOLIC BLOOD PRESSURE: 130 MMHG

## 2022-08-17 VITALS — DIASTOLIC BLOOD PRESSURE: 75 MMHG | SYSTOLIC BLOOD PRESSURE: 133 MMHG

## 2022-08-17 VITALS — SYSTOLIC BLOOD PRESSURE: 163 MMHG | DIASTOLIC BLOOD PRESSURE: 77 MMHG

## 2022-08-17 VITALS — SYSTOLIC BLOOD PRESSURE: 142 MMHG | DIASTOLIC BLOOD PRESSURE: 70 MMHG

## 2022-08-17 VITALS — DIASTOLIC BLOOD PRESSURE: 64 MMHG | SYSTOLIC BLOOD PRESSURE: 125 MMHG

## 2022-08-17 LAB
ALBUMIN SERPL-MCNC: 2 G/DL (ref 3.4–5)
ALP SERPL-CCNC: 64 U/L (ref 45–117)
ALT SERPL-CCNC: 18 U/L (ref 16–61)
ANION GAP SERPL CALCULATED.3IONS-SCNC: 2 MMOL/L (ref 5–15)
BILIRUB SERPL-MCNC: 0.6 MG/DL (ref 0.2–1)
BUN SERPL-MCNC: 24 MG/DL (ref 7–18)
BUN/CREAT SERPL: 24.5
CALCIUM SERPL-MCNC: 7.8 MG/DL (ref 8.5–10.1)
CHLORIDE SERPL-SCNC: 106 MMOL/L (ref 98–107)
CO2 SERPL-SCNC: 29 MMOL/L (ref 21–32)
GLUCOSE SERPL-MCNC: 291 MG/DL (ref 74–106)
MAGNESIUM SERPL-MCNC: 1.6 MG/DL (ref 1.6–2.6)
POTASSIUM SERPL-SCNC: 3.3 MMOL/L (ref 3.5–5.1)
PROT SERPL-MCNC: 5.5 G/DL (ref 6.4–8.2)
SODIUM SERPL-SCNC: 137 MMOL/L (ref 136–145)

## 2022-08-17 RX ADMIN — CEFAZOLIN SODIUM SCH MLS/HR: 1 INJECTION, SOLUTION INTRAVENOUS at 11:00

## 2022-08-17 RX ADMIN — Medication SCH STRIP: at 18:24

## 2022-08-17 RX ADMIN — CARVEDILOL SCH MG: 12.5 TABLET, FILM COATED ORAL at 11:56

## 2022-08-17 RX ADMIN — SACUBITRIL AND VALSARTAN SCH TAB: 24; 26 TABLET, FILM COATED ORAL at 11:56

## 2022-08-17 RX ADMIN — GABAPENTIN SCH MG: 100 CAPSULE ORAL at 05:24

## 2022-08-17 RX ADMIN — Medication SCH ML: at 08:00

## 2022-08-17 RX ADMIN — CARVEDILOL SCH MG: 12.5 TABLET, FILM COATED ORAL at 21:34

## 2022-08-17 RX ADMIN — GABAPENTIN SCH MG: 100 CAPSULE ORAL at 21:35

## 2022-08-17 RX ADMIN — Medication SCH STRIP: at 05:23

## 2022-08-17 RX ADMIN — PANTOPRAZOLE SODIUM SCH MG: 40 INJECTION, POWDER, FOR SOLUTION INTRAVENOUS at 21:34

## 2022-08-17 RX ADMIN — APIXABAN SCH MG: 2.5 TABLET, FILM COATED ORAL at 21:35

## 2022-08-17 RX ADMIN — PANTOPRAZOLE SODIUM SCH MG: 40 INJECTION, POWDER, FOR SOLUTION INTRAVENOUS at 11:54

## 2022-08-17 RX ADMIN — Medication SCH ML: at 18:24

## 2022-08-17 RX ADMIN — ATORVASTATIN CALCIUM SCH MG: 20 TABLET, FILM COATED ORAL at 21:35

## 2022-08-17 RX ADMIN — Medication SCH STRIP: at 23:47

## 2022-08-17 RX ADMIN — SACUBITRIL AND VALSARTAN SCH TAB: 24; 26 TABLET, FILM COATED ORAL at 21:35

## 2022-08-17 RX ADMIN — APIXABAN SCH MG: 2.5 TABLET, FILM COATED ORAL at 11:57

## 2022-08-17 RX ADMIN — HUMAN INSULIN SCH UNITS: 100 INJECTION, SOLUTION SUBCUTANEOUS at 05:25

## 2022-08-17 RX ADMIN — HUMAN INSULIN SCH UNITS: 100 INJECTION, SOLUTION SUBCUTANEOUS at 18:26

## 2022-08-17 RX ADMIN — Medication SCH STRIP: at 12:00

## 2022-08-17 RX ADMIN — HUMAN INSULIN SCH UNITS: 100 INJECTION, SOLUTION SUBCUTANEOUS at 12:00

## 2022-08-17 RX ADMIN — HUMAN INSULIN SCH UNITS: 100 INJECTION, SOLUTION SUBCUTANEOUS at 23:48

## 2022-08-17 RX ADMIN — Medication SCH STRIP: at 00:07

## 2022-08-17 RX ADMIN — LEVOTHYROXINE SODIUM SCH MCG: 50 TABLET ORAL at 06:15

## 2022-08-17 RX ADMIN — HUMAN INSULIN SCH UNITS: 100 INJECTION, SOLUTION SUBCUTANEOUS at 00:07

## 2022-08-17 RX ADMIN — CEFAZOLIN SODIUM SCH MLS/HR: 1 INJECTION, SOLUTION INTRAVENOUS at 02:04

## 2022-08-17 RX ADMIN — LEUCINE, PHENYLALANINE, LYSINE, METHIONINE, ISOLEUCINE, VALINE, HISTIDINE, THREONINE, TRYPTOPHAN, ALANINE, GLYCINE, ARGININE, PROLINE, SERINE, TYROSINE, DEXTROSE SCH MLS/HR: 311; 238; 247; 170; 255; 247; 204; 179; 77; 880; 438; 489; 289; 213; 17; 10 INJECTION INTRAVENOUS at 19:34

## 2022-08-17 RX ADMIN — Medication SCH ML: at 12:00

## 2022-08-17 RX ADMIN — FUROSEMIDE SCH MG: 10 INJECTION, SOLUTION INTRAMUSCULAR; INTRAVENOUS at 11:56

## 2022-08-17 RX ADMIN — GABAPENTIN SCH MG: 100 CAPSULE ORAL at 14:00

## 2022-08-18 VITALS — SYSTOLIC BLOOD PRESSURE: 158 MMHG | DIASTOLIC BLOOD PRESSURE: 67 MMHG

## 2022-08-18 VITALS — DIASTOLIC BLOOD PRESSURE: 67 MMHG | SYSTOLIC BLOOD PRESSURE: 161 MMHG

## 2022-08-18 VITALS — DIASTOLIC BLOOD PRESSURE: 73 MMHG | SYSTOLIC BLOOD PRESSURE: 160 MMHG

## 2022-08-18 VITALS — DIASTOLIC BLOOD PRESSURE: 64 MMHG | SYSTOLIC BLOOD PRESSURE: 125 MMHG

## 2022-08-18 VITALS — DIASTOLIC BLOOD PRESSURE: 65 MMHG | SYSTOLIC BLOOD PRESSURE: 142 MMHG

## 2022-08-18 LAB
ALBUMIN SERPL-MCNC: 1.9 G/DL (ref 3.4–5)
ANION GAP SERPL CALCULATED.3IONS-SCNC: 5 MMOL/L (ref 5–15)
BUN SERPL-MCNC: 28 MG/DL (ref 7–18)
BUN/CREAT SERPL: 36.8
CALCIUM SERPL-MCNC: 7.8 MG/DL (ref 8.5–10.1)
CHLORIDE SERPL-SCNC: 108 MMOL/L (ref 98–107)
CO2 SERPL-SCNC: 27 MMOL/L (ref 21–32)
GLUCOSE SERPL-MCNC: 172 MG/DL (ref 74–106)
MAGNESIUM SERPL-MCNC: 1.8 MG/DL (ref 1.6–2.6)
POTASSIUM SERPL-SCNC: 3.5 MMOL/L (ref 3.5–5.1)
SODIUM SERPL-SCNC: 140 MMOL/L (ref 136–145)

## 2022-08-18 RX ADMIN — PANTOPRAZOLE SODIUM SCH MG: 40 INJECTION, POWDER, FOR SOLUTION INTRAVENOUS at 10:56

## 2022-08-18 RX ADMIN — GABAPENTIN SCH MG: 100 CAPSULE ORAL at 21:55

## 2022-08-18 RX ADMIN — FUROSEMIDE SCH MG: 10 INJECTION, SOLUTION INTRAMUSCULAR; INTRAVENOUS at 10:57

## 2022-08-18 RX ADMIN — Medication SCH STRIP: at 12:00

## 2022-08-18 RX ADMIN — HUMAN INSULIN SCH UNITS: 100 INJECTION, SOLUTION SUBCUTANEOUS at 23:58

## 2022-08-18 RX ADMIN — HUMAN INSULIN SCH UNITS: 100 INJECTION, SOLUTION SUBCUTANEOUS at 05:57

## 2022-08-18 RX ADMIN — GABAPENTIN SCH MG: 100 CAPSULE ORAL at 14:00

## 2022-08-18 RX ADMIN — APIXABAN SCH MG: 2.5 TABLET, FILM COATED ORAL at 10:57

## 2022-08-18 RX ADMIN — Medication SCH ML: at 12:00

## 2022-08-18 RX ADMIN — CARVEDILOL SCH MG: 12.5 TABLET, FILM COATED ORAL at 10:58

## 2022-08-18 RX ADMIN — SACUBITRIL AND VALSARTAN SCH TAB: 24; 26 TABLET, FILM COATED ORAL at 21:55

## 2022-08-18 RX ADMIN — HUMAN INSULIN SCH UNITS: 100 INJECTION, SOLUTION SUBCUTANEOUS at 12:00

## 2022-08-18 RX ADMIN — PANTOPRAZOLE SODIUM SCH MG: 40 INJECTION, POWDER, FOR SOLUTION INTRAVENOUS at 21:54

## 2022-08-18 RX ADMIN — LEUCINE, PHENYLALANINE, LYSINE, METHIONINE, ISOLEUCINE, VALINE, HISTIDINE, THREONINE, TRYPTOPHAN, ALANINE, GLYCINE, ARGININE, PROLINE, SERINE, TYROSINE, DEXTROSE SCH MLS/HR: 311; 238; 247; 170; 255; 247; 204; 179; 77; 880; 438; 489; 289; 213; 17; 10 INJECTION INTRAVENOUS at 19:38

## 2022-08-18 RX ADMIN — Medication SCH ML: at 08:20

## 2022-08-18 RX ADMIN — LEVOTHYROXINE SODIUM SCH MCG: 50 TABLET ORAL at 07:00

## 2022-08-18 RX ADMIN — ATORVASTATIN CALCIUM SCH MG: 20 TABLET, FILM COATED ORAL at 21:56

## 2022-08-18 RX ADMIN — APIXABAN SCH MG: 2.5 TABLET, FILM COATED ORAL at 21:55

## 2022-08-18 RX ADMIN — GABAPENTIN SCH MG: 100 CAPSULE ORAL at 07:00

## 2022-08-18 RX ADMIN — HUMAN INSULIN SCH UNITS: 100 INJECTION, SOLUTION SUBCUTANEOUS at 18:00

## 2022-08-18 RX ADMIN — SACUBITRIL AND VALSARTAN SCH TAB: 24; 26 TABLET, FILM COATED ORAL at 10:57

## 2022-08-18 RX ADMIN — Medication SCH STRIP: at 05:55

## 2022-08-18 RX ADMIN — Medication SCH STRIP: at 18:00

## 2022-08-18 RX ADMIN — Medication SCH ML: at 18:00

## 2022-08-18 RX ADMIN — CARVEDILOL SCH MG: 12.5 TABLET, FILM COATED ORAL at 21:55

## 2022-08-19 VITALS — DIASTOLIC BLOOD PRESSURE: 71 MMHG | SYSTOLIC BLOOD PRESSURE: 142 MMHG

## 2022-08-19 VITALS — SYSTOLIC BLOOD PRESSURE: 126 MMHG | DIASTOLIC BLOOD PRESSURE: 54 MMHG

## 2022-08-19 VITALS — SYSTOLIC BLOOD PRESSURE: 115 MMHG | DIASTOLIC BLOOD PRESSURE: 51 MMHG

## 2022-08-19 VITALS — DIASTOLIC BLOOD PRESSURE: 72 MMHG | SYSTOLIC BLOOD PRESSURE: 146 MMHG

## 2022-08-19 LAB
ALBUMIN SERPL-MCNC: 2.1 G/DL (ref 3.4–5)
ALP SERPL-CCNC: 70 U/L (ref 45–117)
ALT SERPL-CCNC: 31 U/L (ref 16–61)
ANION GAP SERPL CALCULATED.3IONS-SCNC: 6 MMOL/L (ref 5–15)
BILIRUB SERPL-MCNC: 0.7 MG/DL (ref 0.2–1)
BUN SERPL-MCNC: 32 MG/DL (ref 7–18)
BUN/CREAT SERPL: 32
CALCIUM SERPL-MCNC: 8.5 MG/DL (ref 8.5–10.1)
CHLORIDE SERPL-SCNC: 105 MMOL/L (ref 98–107)
CO2 SERPL-SCNC: 29 MMOL/L (ref 21–32)
GLUCOSE SERPL-MCNC: 267 MG/DL (ref 74–106)
HCT VFR BLD AUTO: 40 % (ref 41–53)
HGB BLD-MCNC: 13 G/DL (ref 13.5–17.5)
MCH RBC QN AUTO: 29.2 PG (ref 28–32)
MCV RBC AUTO: 89.6 FL (ref 80–100)
NRBC BLD QL AUTO: 0.1 %
POTASSIUM SERPL-SCNC: 4.5 MMOL/L (ref 3.5–5.1)
PROT SERPL-MCNC: 6.1 G/DL (ref 6.4–8.2)
SODIUM SERPL-SCNC: 140 MMOL/L (ref 136–145)

## 2022-08-19 RX ADMIN — SACUBITRIL AND VALSARTAN SCH TAB: 24; 26 TABLET, FILM COATED ORAL at 10:26

## 2022-08-19 RX ADMIN — PANTOPRAZOLE SODIUM SCH MG: 40 INJECTION, POWDER, FOR SOLUTION INTRAVENOUS at 10:26

## 2022-08-19 RX ADMIN — GABAPENTIN SCH MG: 100 CAPSULE ORAL at 06:00

## 2022-08-19 RX ADMIN — HUMAN INSULIN SCH UNITS: 100 INJECTION, SOLUTION SUBCUTANEOUS at 17:00

## 2022-08-19 RX ADMIN — LEVOTHYROXINE SODIUM SCH MCG: 50 TABLET ORAL at 06:30

## 2022-08-19 RX ADMIN — PANTOPRAZOLE SODIUM SCH MG: 40 INJECTION, POWDER, FOR SOLUTION INTRAVENOUS at 22:25

## 2022-08-19 RX ADMIN — Medication SCH STRIP: at 17:00

## 2022-08-19 RX ADMIN — FUROSEMIDE SCH MG: 10 INJECTION, SOLUTION INTRAMUSCULAR; INTRAVENOUS at 10:26

## 2022-08-19 RX ADMIN — Medication SCH STRIP: at 22:00

## 2022-08-19 RX ADMIN — CARVEDILOL SCH MG: 12.5 TABLET, FILM COATED ORAL at 10:27

## 2022-08-19 RX ADMIN — Medication SCH ML: at 12:00

## 2022-08-19 RX ADMIN — APIXABAN SCH MG: 2.5 TABLET, FILM COATED ORAL at 22:28

## 2022-08-19 RX ADMIN — ATORVASTATIN CALCIUM SCH MG: 20 TABLET, FILM COATED ORAL at 22:28

## 2022-08-19 RX ADMIN — SACUBITRIL AND VALSARTAN SCH TAB: 24; 26 TABLET, FILM COATED ORAL at 22:28

## 2022-08-19 RX ADMIN — HUMAN INSULIN SCH UNITS: 100 INJECTION, SOLUTION SUBCUTANEOUS at 06:00

## 2022-08-19 RX ADMIN — Medication SCH ML: at 18:00

## 2022-08-19 RX ADMIN — Medication SCH ML: at 08:17

## 2022-08-19 RX ADMIN — GABAPENTIN SCH MG: 100 CAPSULE ORAL at 14:00

## 2022-08-19 RX ADMIN — Medication SCH STRIP: at 06:29

## 2022-08-19 RX ADMIN — MEGESTROL ACETATE SCH MG: 20 TABLET ORAL at 22:35

## 2022-08-19 RX ADMIN — CARVEDILOL SCH MG: 12.5 TABLET, FILM COATED ORAL at 22:00

## 2022-08-19 RX ADMIN — Medication SCH STRIP: at 00:00

## 2022-08-19 RX ADMIN — GABAPENTIN SCH MG: 100 CAPSULE ORAL at 22:29

## 2022-08-19 RX ADMIN — MEGESTROL ACETATE SCH MG: 20 TABLET ORAL at 14:00

## 2022-08-19 RX ADMIN — APIXABAN SCH MG: 2.5 TABLET, FILM COATED ORAL at 10:27

## 2022-08-19 RX ADMIN — HUMAN INSULIN SCH UNITS: 100 INJECTION, SOLUTION SUBCUTANEOUS at 22:33

## 2022-08-20 VITALS — DIASTOLIC BLOOD PRESSURE: 52 MMHG | SYSTOLIC BLOOD PRESSURE: 117 MMHG

## 2022-08-20 VITALS — SYSTOLIC BLOOD PRESSURE: 134 MMHG | DIASTOLIC BLOOD PRESSURE: 64 MMHG

## 2022-08-20 VITALS — DIASTOLIC BLOOD PRESSURE: 51 MMHG | SYSTOLIC BLOOD PRESSURE: 115 MMHG

## 2022-08-20 RX ADMIN — HUMAN INSULIN SCH UNITS: 100 INJECTION, SOLUTION SUBCUTANEOUS at 11:30

## 2022-08-20 RX ADMIN — LEVOTHYROXINE SODIUM SCH MCG: 50 TABLET ORAL at 07:00

## 2022-08-20 RX ADMIN — HUMAN INSULIN SCH UNITS: 100 INJECTION, SOLUTION SUBCUTANEOUS at 07:00

## 2022-08-20 RX ADMIN — Medication SCH STRIP: at 11:30

## 2022-08-20 RX ADMIN — Medication SCH STRIP: at 07:00

## 2022-08-20 RX ADMIN — CARVEDILOL SCH MG: 12.5 TABLET, FILM COATED ORAL at 10:00

## 2022-08-20 RX ADMIN — MEGESTROL ACETATE SCH MG: 20 TABLET ORAL at 06:00

## 2022-08-20 RX ADMIN — GABAPENTIN SCH MG: 100 CAPSULE ORAL at 06:00

## 2022-08-20 RX ADMIN — PANTOPRAZOLE SODIUM SCH MG: 40 INJECTION, POWDER, FOR SOLUTION INTRAVENOUS at 10:00

## 2022-08-20 RX ADMIN — FUROSEMIDE SCH MG: 10 INJECTION, SOLUTION INTRAMUSCULAR; INTRAVENOUS at 10:00

## 2022-08-20 RX ADMIN — Medication SCH ML: at 08:00

## 2022-08-20 RX ADMIN — SACUBITRIL AND VALSARTAN SCH TAB: 24; 26 TABLET, FILM COATED ORAL at 10:00

## 2022-08-20 RX ADMIN — APIXABAN SCH MG: 2.5 TABLET, FILM COATED ORAL at 10:00

## 2022-08-23 ENCOUNTER — HOSPITAL ENCOUNTER (INPATIENT)
Dept: HOSPITAL 15 - ER | Age: 87
LOS: 9 days | Discharge: HOME HEALTH | DRG: 70 | End: 2022-09-01
Attending: INTERNAL MEDICINE | Admitting: INTERNAL MEDICINE
Payer: MEDICARE

## 2022-08-23 VITALS — BODY MASS INDEX: 23.72 KG/M2 | WEIGHT: 156.53 LBS | HEIGHT: 68 IN

## 2022-08-23 DIAGNOSIS — K80.20: ICD-10-CM

## 2022-08-23 DIAGNOSIS — Z20.822: ICD-10-CM

## 2022-08-23 DIAGNOSIS — Z79.4: ICD-10-CM

## 2022-08-23 DIAGNOSIS — E11.65: ICD-10-CM
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
## 2022-08-23 DIAGNOSIS — G93.41: Primary | ICD-10-CM

## 2022-08-23 DIAGNOSIS — I25.5: ICD-10-CM

## 2022-08-23 DIAGNOSIS — K86.89: ICD-10-CM

## 2022-08-23 DIAGNOSIS — K42.9: ICD-10-CM

## 2022-08-23 DIAGNOSIS — D64.9: ICD-10-CM

## 2022-08-23 DIAGNOSIS — N40.0: ICD-10-CM

## 2022-08-23 DIAGNOSIS — E11.40: ICD-10-CM

## 2022-08-23 DIAGNOSIS — I13.0: ICD-10-CM
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
## 2022-08-23 DIAGNOSIS — I50.21: ICD-10-CM

## 2022-08-23 DIAGNOSIS — K64.9: ICD-10-CM

## 2022-08-23 DIAGNOSIS — N30.91: ICD-10-CM

## 2022-08-23 DIAGNOSIS — E87.5: ICD-10-CM

## 2022-08-23 DIAGNOSIS — S42.392A: ICD-10-CM

## 2022-08-23 DIAGNOSIS — E03.9: ICD-10-CM

## 2022-08-23 DIAGNOSIS — E11.22: ICD-10-CM

## 2022-08-23 DIAGNOSIS — Z79.01: ICD-10-CM

## 2022-08-23 DIAGNOSIS — N35.919: ICD-10-CM

## 2022-08-23 DIAGNOSIS — N18.2: ICD-10-CM

## 2022-08-23 DIAGNOSIS — G47.33: ICD-10-CM
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
## 2022-08-23 DIAGNOSIS — Z95.0: ICD-10-CM

## 2022-08-23 DIAGNOSIS — N17.9: ICD-10-CM

## 2022-08-23 DIAGNOSIS — E87.6: ICD-10-CM

## 2022-08-23 LAB
ALBUMIN SERPL-MCNC: 2.2 G/DL (ref 3.4–5)
ALP SERPL-CCNC: 78 U/L (ref 45–117)
ALT SERPL-CCNC: 55 U/L (ref 16–61)
ANION GAP SERPL CALCULATED.3IONS-SCNC: 5 MMOL/L (ref 5–15)
APTT PPP: 34.5 SEC (ref 24.6–33.4)
BILIRUB SERPL-MCNC: 0.8 MG/DL (ref 0.2–1)
BUN SERPL-MCNC: 21 MG/DL (ref 7–18)
BUN/CREAT SERPL: 21.4
CALCIUM SERPL-MCNC: 8.5 MG/DL (ref 8.5–10.1)
CHLORIDE SERPL-SCNC: 107 MMOL/L (ref 98–107)
CO2 SERPL-SCNC: 29 MMOL/L (ref 21–32)
GLUCOSE SERPL-MCNC: 198 MG/DL (ref 74–106)
HCT VFR BLD AUTO: 35.7 % (ref 41–53)
HGB BLD-MCNC: 11.7 G/DL (ref 13.5–17.5)
INR PPP: 1.29 (ref 0.9–1.15)
LIPASE SERPL-CCNC: 39 U/L (ref 73–393)
MAGNESIUM SERPL-MCNC: 1.7 MG/DL (ref 1.6–2.6)
MCH RBC QN AUTO: 29.5 PG (ref 28–32)
MCV RBC AUTO: 90.2 FL (ref 80–100)
NRBC BLD QL AUTO: 0 %
POTASSIUM SERPL-SCNC: 3.7 MMOL/L (ref 3.5–5.1)
PROT SERPL-MCNC: 5.5 G/DL (ref 6.4–8.2)
SODIUM SERPL-SCNC: 141 MMOL/L (ref 136–145)
WBC CLUMPS UR QL AUTO: PRESENT /HPF
YEAST # UR AUTO: (no result) /HPF

## 2022-08-23 PROCEDURE — 74176 CT ABD & PELVIS W/O CONTRAST: CPT

## 2022-08-23 PROCEDURE — 87186 SC STD MICRODIL/AGAR DIL: CPT

## 2022-08-23 PROCEDURE — 84100 ASSAY OF PHOSPHORUS: CPT

## 2022-08-23 PROCEDURE — 86900 BLOOD TYPING SEROLOGIC ABO: CPT

## 2022-08-23 PROCEDURE — 87081 CULTURE SCREEN ONLY: CPT

## 2022-08-23 PROCEDURE — 70450 CT HEAD/BRAIN W/O DYE: CPT

## 2022-08-23 PROCEDURE — 82962 GLUCOSE BLOOD TEST: CPT

## 2022-08-23 PROCEDURE — 71045 X-RAY EXAM CHEST 1 VIEW: CPT

## 2022-08-23 PROCEDURE — 87040 BLOOD CULTURE FOR BACTERIA: CPT

## 2022-08-23 PROCEDURE — 87086 URINE CULTURE/COLONY COUNT: CPT

## 2022-08-23 PROCEDURE — 85610 PROTHROMBIN TIME: CPT

## 2022-08-23 PROCEDURE — 97163 PT EVAL HIGH COMPLEX 45 MIN: CPT

## 2022-08-23 PROCEDURE — 86901 BLOOD TYPING SEROLOGIC RH(D): CPT

## 2022-08-23 PROCEDURE — 84484 ASSAY OF TROPONIN QUANT: CPT

## 2022-08-23 PROCEDURE — 84443 ASSAY THYROID STIM HORMONE: CPT

## 2022-08-23 PROCEDURE — 96365 THER/PROPH/DIAG IV INF INIT: CPT

## 2022-08-23 PROCEDURE — 86850 RBC ANTIBODY SCREEN: CPT

## 2022-08-23 PROCEDURE — 83690 ASSAY OF LIPASE: CPT

## 2022-08-23 PROCEDURE — 81001 URINALYSIS AUTO W/SCOPE: CPT

## 2022-08-23 PROCEDURE — 80048 BASIC METABOLIC PNL TOTAL CA: CPT

## 2022-08-23 PROCEDURE — 36415 COLL VENOUS BLD VENIPUNCTURE: CPT

## 2022-08-23 PROCEDURE — 97530 THERAPEUTIC ACTIVITIES: CPT

## 2022-08-23 PROCEDURE — 80053 COMPREHEN METABOLIC PANEL: CPT

## 2022-08-23 PROCEDURE — 82140 ASSAY OF AMMONIA: CPT

## 2022-08-23 PROCEDURE — 83605 ASSAY OF LACTIC ACID: CPT

## 2022-08-23 PROCEDURE — 85007 BL SMEAR W/DIFF WBC COUNT: CPT

## 2022-08-23 PROCEDURE — 83735 ASSAY OF MAGNESIUM: CPT

## 2022-08-23 PROCEDURE — 93005 ELECTROCARDIOGRAM TRACING: CPT

## 2022-08-23 PROCEDURE — 83880 ASSAY OF NATRIURETIC PEPTIDE: CPT

## 2022-08-23 PROCEDURE — 87088 URINE BACTERIA CULTURE: CPT

## 2022-08-23 PROCEDURE — 85027 COMPLETE CBC AUTOMATED: CPT

## 2022-08-23 PROCEDURE — 97110 THERAPEUTIC EXERCISES: CPT

## 2022-08-23 PROCEDURE — 87077 CULTURE AEROBIC IDENTIFY: CPT

## 2022-08-23 PROCEDURE — 85025 COMPLETE CBC W/AUTO DIFF WBC: CPT

## 2022-08-23 PROCEDURE — 85730 THROMBOPLASTIN TIME PARTIAL: CPT

## 2022-08-24 VITALS — SYSTOLIC BLOOD PRESSURE: 136 MMHG | DIASTOLIC BLOOD PRESSURE: 69 MMHG

## 2022-08-24 VITALS — SYSTOLIC BLOOD PRESSURE: 128 MMHG | DIASTOLIC BLOOD PRESSURE: 65 MMHG

## 2022-08-24 VITALS — DIASTOLIC BLOOD PRESSURE: 67 MMHG | SYSTOLIC BLOOD PRESSURE: 160 MMHG

## 2022-08-24 VITALS — DIASTOLIC BLOOD PRESSURE: 60 MMHG | SYSTOLIC BLOOD PRESSURE: 131 MMHG

## 2022-08-24 VITALS — DIASTOLIC BLOOD PRESSURE: 94 MMHG | SYSTOLIC BLOOD PRESSURE: 141 MMHG

## 2022-08-24 RX ADMIN — HUMAN INSULIN SCH UNITS: 100 INJECTION, SOLUTION SUBCUTANEOUS at 05:49

## 2022-08-24 RX ADMIN — APIXABAN SCH MG: 5 TABLET, FILM COATED ORAL at 11:32

## 2022-08-24 RX ADMIN — PIPERACILLIN SODIUM AND TAZOBACTAM SODIUM SCH MLS/HR: .5; 4 INJECTION, POWDER, LYOPHILIZED, FOR SOLUTION INTRAVENOUS at 15:37

## 2022-08-24 RX ADMIN — SODIUM CHLORIDE SCH MLS/HR: 0.9 INJECTION, SOLUTION INTRAVENOUS at 13:01

## 2022-08-24 RX ADMIN — LEVOTHYROXINE SODIUM SCH MCG: 50 TABLET ORAL at 08:13

## 2022-08-24 RX ADMIN — POLYVINYL ALCOHOL SCH DROP: 14 SOLUTION/ DROPS OPHTHALMIC at 15:38

## 2022-08-24 RX ADMIN — HUMAN INSULIN SCH UNITS: 100 INJECTION, SOLUTION SUBCUTANEOUS at 00:00

## 2022-08-24 RX ADMIN — PIPERACILLIN SODIUM AND TAZOBACTAM SODIUM SCH MLS/HR: .5; 4 INJECTION, POWDER, LYOPHILIZED, FOR SOLUTION INTRAVENOUS at 05:36

## 2022-08-24 RX ADMIN — Medication SCH STRIP: at 17:25

## 2022-08-24 RX ADMIN — Medication SCH STRIP: at 00:08

## 2022-08-24 RX ADMIN — HUMAN INSULIN SCH UNITS: 100 INJECTION, SOLUTION SUBCUTANEOUS at 12:34

## 2022-08-24 RX ADMIN — SACUBITRIL AND VALSARTAN SCH TAB: 24; 26 TABLET, FILM COATED ORAL at 22:35

## 2022-08-24 RX ADMIN — GABAPENTIN SCH MG: 100 CAPSULE ORAL at 22:35

## 2022-08-24 RX ADMIN — PIPERACILLIN SODIUM AND TAZOBACTAM SODIUM SCH MLS/HR: .5; 4 INJECTION, POWDER, LYOPHILIZED, FOR SOLUTION INTRAVENOUS at 22:25

## 2022-08-24 RX ADMIN — PANTOPRAZOLE SODIUM SCH MG: 40 INJECTION, POWDER, FOR SOLUTION INTRAVENOUS at 11:33

## 2022-08-24 RX ADMIN — APIXABAN SCH MG: 5 TABLET, FILM COATED ORAL at 22:35

## 2022-08-24 RX ADMIN — TAMSULOSIN HYDROCHLORIDE SCH MG: 0.4 CAPSULE ORAL at 17:26

## 2022-08-24 RX ADMIN — CARVEDILOL SCH MG: 3.12 TABLET, FILM COATED ORAL at 22:36

## 2022-08-24 RX ADMIN — Medication SCH STRIP: at 12:32

## 2022-08-24 RX ADMIN — SACUBITRIL AND VALSARTAN SCH TAB: 24; 26 TABLET, FILM COATED ORAL at 11:31

## 2022-08-24 RX ADMIN — ATORVASTATIN CALCIUM SCH MG: 20 TABLET, FILM COATED ORAL at 17:26

## 2022-08-24 RX ADMIN — GABAPENTIN SCH MG: 100 CAPSULE ORAL at 15:37

## 2022-08-24 RX ADMIN — SODIUM CHLORIDE SCH MLS/HR: 9 INJECTION, SOLUTION INTRAVENOUS at 12:32

## 2022-08-24 RX ADMIN — HUMAN INSULIN SCH UNITS: 100 INJECTION, SOLUTION SUBCUTANEOUS at 17:25

## 2022-08-24 RX ADMIN — SODIUM CHLORIDE SCH MLS/HR: 0.9 INJECTION, SOLUTION INTRAVENOUS at 00:20

## 2022-08-24 RX ADMIN — GABAPENTIN SCH MG: 100 CAPSULE ORAL at 05:35

## 2022-08-24 RX ADMIN — POLYVINYL ALCOHOL SCH DROP: 14 SOLUTION/ DROPS OPHTHALMIC at 22:37

## 2022-08-24 RX ADMIN — CARVEDILOL SCH MG: 3.12 TABLET, FILM COATED ORAL at 11:32

## 2022-08-24 RX ADMIN — Medication SCH STRIP: at 05:35

## 2022-08-24 RX ADMIN — POLYVINYL ALCOHOL SCH DROP: 14 SOLUTION/ DROPS OPHTHALMIC at 05:35

## 2022-08-25 VITALS — DIASTOLIC BLOOD PRESSURE: 59 MMHG | SYSTOLIC BLOOD PRESSURE: 107 MMHG

## 2022-08-25 VITALS — DIASTOLIC BLOOD PRESSURE: 60 MMHG | SYSTOLIC BLOOD PRESSURE: 131 MMHG

## 2022-08-25 VITALS — SYSTOLIC BLOOD PRESSURE: 124 MMHG | DIASTOLIC BLOOD PRESSURE: 57 MMHG

## 2022-08-25 VITALS — SYSTOLIC BLOOD PRESSURE: 115 MMHG | DIASTOLIC BLOOD PRESSURE: 22 MMHG

## 2022-08-25 VITALS — DIASTOLIC BLOOD PRESSURE: 40 MMHG | SYSTOLIC BLOOD PRESSURE: 135 MMHG

## 2022-08-25 LAB
ANION GAP SERPL CALCULATED.3IONS-SCNC: 6 MMOL/L (ref 5–15)
BUN SERPL-MCNC: 21 MG/DL (ref 7–18)
BUN/CREAT SERPL: 19.4
CALCIUM SERPL-MCNC: 8 MG/DL (ref 8.5–10.1)
CHLORIDE SERPL-SCNC: 110 MMOL/L (ref 98–107)
CO2 SERPL-SCNC: 25 MMOL/L (ref 21–32)
GLUCOSE SERPL-MCNC: 196 MG/DL (ref 74–106)
POTASSIUM SERPL-SCNC: 3.3 MMOL/L (ref 3.5–5.1)
SODIUM SERPL-SCNC: 141 MMOL/L (ref 136–145)
WBC CLUMPS UR QL AUTO: PRESENT /HPF

## 2022-08-25 RX ADMIN — CARVEDILOL SCH MG: 3.12 TABLET, FILM COATED ORAL at 22:37

## 2022-08-25 RX ADMIN — HUMAN INSULIN SCH UNITS: 100 INJECTION, SOLUTION SUBCUTANEOUS at 00:16

## 2022-08-25 RX ADMIN — PIPERACILLIN SODIUM AND TAZOBACTAM SODIUM SCH MLS/HR: .5; 4 INJECTION, POWDER, LYOPHILIZED, FOR SOLUTION INTRAVENOUS at 05:52

## 2022-08-25 RX ADMIN — Medication SCH STRIP: at 12:59

## 2022-08-25 RX ADMIN — HUMAN INSULIN SCH UNITS: 100 INJECTION, SOLUTION SUBCUTANEOUS at 18:10

## 2022-08-25 RX ADMIN — SODIUM CHLORIDE SCH MLS/HR: 9 INJECTION, SOLUTION INTRAVENOUS at 13:04

## 2022-08-25 RX ADMIN — LEVOTHYROXINE SODIUM SCH MCG: 50 TABLET ORAL at 08:20

## 2022-08-25 RX ADMIN — TAMSULOSIN HYDROCHLORIDE SCH MG: 0.4 CAPSULE ORAL at 18:11

## 2022-08-25 RX ADMIN — SPIRONOLACTONE SCH MG: 25 TABLET, FILM COATED ORAL at 13:03

## 2022-08-25 RX ADMIN — POLYVINYL ALCOHOL SCH DROP: 14 SOLUTION/ DROPS OPHTHALMIC at 22:34

## 2022-08-25 RX ADMIN — APIXABAN SCH MG: 5 TABLET, FILM COATED ORAL at 10:05

## 2022-08-25 RX ADMIN — ATORVASTATIN CALCIUM SCH MG: 20 TABLET, FILM COATED ORAL at 18:11

## 2022-08-25 RX ADMIN — HUMAN INSULIN SCH UNITS: 100 INJECTION, SOLUTION SUBCUTANEOUS at 12:03

## 2022-08-25 RX ADMIN — FUROSEMIDE SCH MG: 20 TABLET ORAL at 22:43

## 2022-08-25 RX ADMIN — POLYVINYL ALCOHOL SCH DROP: 14 SOLUTION/ DROPS OPHTHALMIC at 05:51

## 2022-08-25 RX ADMIN — POLYVINYL ALCOHOL SCH DROP: 14 SOLUTION/ DROPS OPHTHALMIC at 14:03

## 2022-08-25 RX ADMIN — PIPERACILLIN SODIUM AND TAZOBACTAM SODIUM SCH MLS/HR: .5; 4 INJECTION, POWDER, LYOPHILIZED, FOR SOLUTION INTRAVENOUS at 22:34

## 2022-08-25 RX ADMIN — GABAPENTIN SCH MG: 100 CAPSULE ORAL at 13:25

## 2022-08-25 RX ADMIN — SACUBITRIL AND VALSARTAN SCH TAB: 24; 26 TABLET, FILM COATED ORAL at 10:52

## 2022-08-25 RX ADMIN — CARVEDILOL SCH MG: 3.12 TABLET, FILM COATED ORAL at 10:05

## 2022-08-25 RX ADMIN — APIXABAN SCH MG: 5 TABLET, FILM COATED ORAL at 22:38

## 2022-08-25 RX ADMIN — Medication SCH STRIP: at 18:13

## 2022-08-25 RX ADMIN — Medication SCH STRIP: at 05:52

## 2022-08-25 RX ADMIN — SODIUM CHLORIDE SCH MLS/HR: 0.9 INJECTION, SOLUTION INTRAVENOUS at 02:30

## 2022-08-25 RX ADMIN — HUMAN INSULIN SCH UNITS: 100 INJECTION, SOLUTION SUBCUTANEOUS at 05:55

## 2022-08-25 RX ADMIN — GABAPENTIN SCH MG: 100 CAPSULE ORAL at 22:44

## 2022-08-25 RX ADMIN — Medication SCH STRIP: at 00:16

## 2022-08-25 RX ADMIN — PANTOPRAZOLE SODIUM SCH MG: 40 INJECTION, POWDER, FOR SOLUTION INTRAVENOUS at 10:03

## 2022-08-25 RX ADMIN — SODIUM CHLORIDE SCH MLS/HR: 0.9 INJECTION, SOLUTION INTRAVENOUS at 16:11

## 2022-08-25 RX ADMIN — PIPERACILLIN SODIUM AND TAZOBACTAM SODIUM SCH MLS/HR: .5; 4 INJECTION, POWDER, LYOPHILIZED, FOR SOLUTION INTRAVENOUS at 14:18

## 2022-08-25 RX ADMIN — SACUBITRIL AND VALSARTAN SCH TAB: 24; 26 TABLET, FILM COATED ORAL at 22:43

## 2022-08-25 RX ADMIN — GABAPENTIN SCH MG: 100 CAPSULE ORAL at 05:52

## 2022-08-26 VITALS — SYSTOLIC BLOOD PRESSURE: 128 MMHG | DIASTOLIC BLOOD PRESSURE: 50 MMHG

## 2022-08-26 VITALS — DIASTOLIC BLOOD PRESSURE: 61 MMHG | SYSTOLIC BLOOD PRESSURE: 132 MMHG

## 2022-08-26 VITALS — SYSTOLIC BLOOD PRESSURE: 141 MMHG | DIASTOLIC BLOOD PRESSURE: 55 MMHG

## 2022-08-26 VITALS — DIASTOLIC BLOOD PRESSURE: 55 MMHG | SYSTOLIC BLOOD PRESSURE: 132 MMHG

## 2022-08-26 VITALS — SYSTOLIC BLOOD PRESSURE: 127 MMHG | DIASTOLIC BLOOD PRESSURE: 71 MMHG

## 2022-08-26 LAB
ANION GAP SERPL CALCULATED.3IONS-SCNC: 5 MMOL/L (ref 5–15)
BUN SERPL-MCNC: 15 MG/DL (ref 7–18)
BUN/CREAT SERPL: 14.2
CALCIUM SERPL-MCNC: 8 MG/DL (ref 8.5–10.1)
CHLORIDE SERPL-SCNC: 108 MMOL/L (ref 98–107)
CO2 SERPL-SCNC: 26 MMOL/L (ref 21–32)
GLUCOSE SERPL-MCNC: 275 MG/DL (ref 74–106)
HCT VFR BLD AUTO: 31.5 % (ref 41–53)
HGB BLD-MCNC: 10.4 G/DL (ref 13.5–17.5)
MCH RBC QN AUTO: 29.9 PG (ref 28–32)
MCV RBC AUTO: 90.6 FL (ref 80–100)
POTASSIUM SERPL-SCNC: 3.6 MMOL/L (ref 3.5–5.1)
SODIUM SERPL-SCNC: 139 MMOL/L (ref 136–145)

## 2022-08-26 RX ADMIN — LEVOTHYROXINE SODIUM SCH MCG: 50 TABLET ORAL at 10:54

## 2022-08-26 RX ADMIN — POLYVINYL ALCOHOL SCH DROP: 14 SOLUTION/ DROPS OPHTHALMIC at 22:00

## 2022-08-26 RX ADMIN — APIXABAN SCH MG: 5 TABLET, FILM COATED ORAL at 10:00

## 2022-08-26 RX ADMIN — HUMAN INSULIN SCH UNITS: 100 INJECTION, SOLUTION SUBCUTANEOUS at 00:17

## 2022-08-26 RX ADMIN — CARVEDILOL SCH MG: 3.12 TABLET, FILM COATED ORAL at 10:27

## 2022-08-26 RX ADMIN — GABAPENTIN SCH MG: 100 CAPSULE ORAL at 22:02

## 2022-08-26 RX ADMIN — POLYVINYL ALCOHOL SCH DROP: 14 SOLUTION/ DROPS OPHTHALMIC at 05:30

## 2022-08-26 RX ADMIN — SODIUM CHLORIDE SCH MLS/HR: 9 INJECTION, SOLUTION INTRAVENOUS at 13:26

## 2022-08-26 RX ADMIN — Medication SCH STRIP: at 18:33

## 2022-08-26 RX ADMIN — SODIUM CHLORIDE SCH MLS/HR: 0.9 INJECTION, SOLUTION INTRAVENOUS at 15:45

## 2022-08-26 RX ADMIN — FUROSEMIDE SCH MG: 20 TABLET ORAL at 22:01

## 2022-08-26 RX ADMIN — GABAPENTIN SCH MG: 100 CAPSULE ORAL at 05:32

## 2022-08-26 RX ADMIN — PIPERACILLIN SODIUM AND TAZOBACTAM SODIUM SCH MLS/HR: .5; 4 INJECTION, POWDER, LYOPHILIZED, FOR SOLUTION INTRAVENOUS at 05:31

## 2022-08-26 RX ADMIN — GABAPENTIN SCH MG: 100 CAPSULE ORAL at 13:27

## 2022-08-26 RX ADMIN — HUMAN INSULIN SCH UNITS: 100 INJECTION, SOLUTION SUBCUTANEOUS at 12:38

## 2022-08-26 RX ADMIN — FUROSEMIDE SCH MG: 20 TABLET ORAL at 10:26

## 2022-08-26 RX ADMIN — CARVEDILOL SCH MG: 3.12 TABLET, FILM COATED ORAL at 22:00

## 2022-08-26 RX ADMIN — SPIRONOLACTONE SCH MG: 25 TABLET, FILM COATED ORAL at 10:27

## 2022-08-26 RX ADMIN — PANTOPRAZOLE SODIUM SCH MG: 40 INJECTION, POWDER, FOR SOLUTION INTRAVENOUS at 10:27

## 2022-08-26 RX ADMIN — SACUBITRIL AND VALSARTAN SCH TAB: 24; 26 TABLET, FILM COATED ORAL at 10:26

## 2022-08-26 RX ADMIN — TAMSULOSIN HYDROCHLORIDE SCH MG: 0.4 CAPSULE ORAL at 18:08

## 2022-08-26 RX ADMIN — HUMAN INSULIN SCH UNITS: 100 INJECTION, SOLUTION SUBCUTANEOUS at 05:33

## 2022-08-26 RX ADMIN — Medication SCH STRIP: at 00:16

## 2022-08-26 RX ADMIN — POLYVINYL ALCOHOL SCH DROP: 14 SOLUTION/ DROPS OPHTHALMIC at 18:33

## 2022-08-26 RX ADMIN — HUMAN INSULIN SCH UNITS: 100 INJECTION, SOLUTION SUBCUTANEOUS at 17:44

## 2022-08-26 RX ADMIN — PIPERACILLIN SODIUM AND TAZOBACTAM SODIUM SCH MLS/HR: .5; 4 INJECTION, POWDER, LYOPHILIZED, FOR SOLUTION INTRAVENOUS at 13:28

## 2022-08-26 RX ADMIN — Medication SCH STRIP: at 12:41

## 2022-08-26 RX ADMIN — SACUBITRIL AND VALSARTAN SCH TAB: 24; 26 TABLET, FILM COATED ORAL at 22:00

## 2022-08-26 RX ADMIN — ATORVASTATIN CALCIUM SCH MG: 20 TABLET, FILM COATED ORAL at 18:08

## 2022-08-26 RX ADMIN — Medication SCH STRIP: at 05:33

## 2022-08-26 RX ADMIN — PIPERACILLIN SODIUM AND TAZOBACTAM SODIUM SCH MLS/HR: .5; 4 INJECTION, POWDER, LYOPHILIZED, FOR SOLUTION INTRAVENOUS at 21:59

## 2022-08-27 VITALS — SYSTOLIC BLOOD PRESSURE: 118 MMHG | DIASTOLIC BLOOD PRESSURE: 47 MMHG

## 2022-08-27 VITALS — SYSTOLIC BLOOD PRESSURE: 135 MMHG | DIASTOLIC BLOOD PRESSURE: 54 MMHG

## 2022-08-27 VITALS — DIASTOLIC BLOOD PRESSURE: 72 MMHG | SYSTOLIC BLOOD PRESSURE: 147 MMHG

## 2022-08-27 VITALS — DIASTOLIC BLOOD PRESSURE: 72 MMHG | SYSTOLIC BLOOD PRESSURE: 139 MMHG

## 2022-08-27 VITALS — DIASTOLIC BLOOD PRESSURE: 57 MMHG | SYSTOLIC BLOOD PRESSURE: 125 MMHG

## 2022-08-27 LAB
HCT VFR BLD AUTO: 30.8 % (ref 41–53)
HGB BLD-MCNC: 10.5 G/DL (ref 13.5–17.5)
MCH RBC QN AUTO: 30.6 PG (ref 28–32)
MCV RBC AUTO: 89.7 FL (ref 80–100)
NRBC BLD QL AUTO: 0 %

## 2022-08-27 RX ADMIN — SACUBITRIL AND VALSARTAN SCH TAB: 24; 26 TABLET, FILM COATED ORAL at 21:50

## 2022-08-27 RX ADMIN — SPIRONOLACTONE SCH MG: 25 TABLET, FILM COATED ORAL at 10:48

## 2022-08-27 RX ADMIN — GABAPENTIN SCH MG: 100 CAPSULE ORAL at 21:50

## 2022-08-27 RX ADMIN — POLYVINYL ALCOHOL SCH DROP: 14 SOLUTION/ DROPS OPHTHALMIC at 05:46

## 2022-08-27 RX ADMIN — SACUBITRIL AND VALSARTAN SCH TAB: 24; 26 TABLET, FILM COATED ORAL at 10:59

## 2022-08-27 RX ADMIN — HUMAN INSULIN SCH UNITS: 100 INJECTION, SOLUTION SUBCUTANEOUS at 17:01

## 2022-08-27 RX ADMIN — PANTOPRAZOLE SODIUM SCH MG: 40 INJECTION, POWDER, FOR SOLUTION INTRAVENOUS at 10:48

## 2022-08-27 RX ADMIN — GABAPENTIN SCH MG: 100 CAPSULE ORAL at 05:47

## 2022-08-27 RX ADMIN — PIPERACILLIN SODIUM AND TAZOBACTAM SODIUM SCH MLS/HR: .5; 4 INJECTION, POWDER, LYOPHILIZED, FOR SOLUTION INTRAVENOUS at 05:46

## 2022-08-27 RX ADMIN — POLYVINYL ALCOHOL SCH DROP: 14 SOLUTION/ DROPS OPHTHALMIC at 15:18

## 2022-08-27 RX ADMIN — HUMAN INSULIN SCH UNITS: 100 INJECTION, SOLUTION SUBCUTANEOUS at 23:47

## 2022-08-27 RX ADMIN — SODIUM CHLORIDE SCH MLS/HR: 9 INJECTION, SOLUTION INTRAVENOUS at 11:14

## 2022-08-27 RX ADMIN — Medication SCH STRIP: at 05:48

## 2022-08-27 RX ADMIN — FUROSEMIDE SCH MG: 20 TABLET ORAL at 21:51

## 2022-08-27 RX ADMIN — ATORVASTATIN CALCIUM SCH MG: 20 TABLET, FILM COATED ORAL at 17:08

## 2022-08-27 RX ADMIN — CARVEDILOL SCH MG: 3.12 TABLET, FILM COATED ORAL at 10:59

## 2022-08-27 RX ADMIN — HUMAN INSULIN SCH UNITS: 100 INJECTION, SOLUTION SUBCUTANEOUS at 00:04

## 2022-08-27 RX ADMIN — HUMAN INSULIN SCH UNITS: 100 INJECTION, SOLUTION SUBCUTANEOUS at 11:50

## 2022-08-27 RX ADMIN — HUMAN INSULIN SCH UNITS: 100 INJECTION, SOLUTION SUBCUTANEOUS at 05:49

## 2022-08-27 RX ADMIN — Medication SCH STRIP: at 17:08

## 2022-08-27 RX ADMIN — Medication SCH STRIP: at 23:46

## 2022-08-27 RX ADMIN — TAMSULOSIN HYDROCHLORIDE SCH MG: 0.4 CAPSULE ORAL at 17:08

## 2022-08-27 RX ADMIN — Medication SCH STRIP: at 11:53

## 2022-08-27 RX ADMIN — FUROSEMIDE SCH MG: 20 TABLET ORAL at 11:00

## 2022-08-27 RX ADMIN — SODIUM CHLORIDE SCH MLS/HR: 0.9 INJECTION, SOLUTION INTRAVENOUS at 15:45

## 2022-08-27 RX ADMIN — GABAPENTIN SCH MG: 100 CAPSULE ORAL at 15:20

## 2022-08-27 RX ADMIN — PIPERACILLIN SODIUM AND TAZOBACTAM SODIUM SCH MLS/HR: .5; 4 INJECTION, POWDER, LYOPHILIZED, FOR SOLUTION INTRAVENOUS at 21:51

## 2022-08-27 RX ADMIN — LEVOTHYROXINE SODIUM SCH MCG: 50 TABLET ORAL at 10:47

## 2022-08-27 RX ADMIN — PIPERACILLIN SODIUM AND TAZOBACTAM SODIUM SCH MLS/HR: .5; 4 INJECTION, POWDER, LYOPHILIZED, FOR SOLUTION INTRAVENOUS at 15:19

## 2022-08-27 RX ADMIN — CARVEDILOL SCH MG: 3.12 TABLET, FILM COATED ORAL at 21:51

## 2022-08-27 RX ADMIN — POLYVINYL ALCOHOL SCH DROP: 14 SOLUTION/ DROPS OPHTHALMIC at 21:52

## 2022-08-27 RX ADMIN — Medication SCH STRIP: at 00:00

## 2022-08-28 VITALS — SYSTOLIC BLOOD PRESSURE: 126 MMHG | DIASTOLIC BLOOD PRESSURE: 59 MMHG

## 2022-08-28 VITALS — DIASTOLIC BLOOD PRESSURE: 65 MMHG | SYSTOLIC BLOOD PRESSURE: 128 MMHG

## 2022-08-28 VITALS — SYSTOLIC BLOOD PRESSURE: 115 MMHG | DIASTOLIC BLOOD PRESSURE: 55 MMHG

## 2022-08-28 VITALS — DIASTOLIC BLOOD PRESSURE: 70 MMHG | SYSTOLIC BLOOD PRESSURE: 135 MMHG

## 2022-08-28 VITALS — DIASTOLIC BLOOD PRESSURE: 53 MMHG | SYSTOLIC BLOOD PRESSURE: 116 MMHG

## 2022-08-28 RX ADMIN — TAMSULOSIN HYDROCHLORIDE SCH MG: 0.4 CAPSULE ORAL at 17:43

## 2022-08-28 RX ADMIN — HUMAN INSULIN SCH UNITS: 100 INJECTION, SOLUTION SUBCUTANEOUS at 05:55

## 2022-08-28 RX ADMIN — LEVOTHYROXINE SODIUM SCH MCG: 50 TABLET ORAL at 10:35

## 2022-08-28 RX ADMIN — PIPERACILLIN SODIUM AND TAZOBACTAM SODIUM SCH MLS/HR: .5; 4 INJECTION, POWDER, LYOPHILIZED, FOR SOLUTION INTRAVENOUS at 22:27

## 2022-08-28 RX ADMIN — SODIUM CHLORIDE SCH MLS/HR: 9 INJECTION, SOLUTION INTRAVENOUS at 11:39

## 2022-08-28 RX ADMIN — FUROSEMIDE SCH MG: 20 TABLET ORAL at 10:39

## 2022-08-28 RX ADMIN — PIPERACILLIN SODIUM AND TAZOBACTAM SODIUM SCH MLS/HR: .5; 4 INJECTION, POWDER, LYOPHILIZED, FOR SOLUTION INTRAVENOUS at 10:35

## 2022-08-28 RX ADMIN — CARVEDILOL SCH MG: 3.12 TABLET, FILM COATED ORAL at 10:37

## 2022-08-28 RX ADMIN — SODIUM CHLORIDE SCH MLS/HR: 0.9 INJECTION, SOLUTION INTRAVENOUS at 17:00

## 2022-08-28 RX ADMIN — SODIUM CHLORIDE SCH MLS/HR: 0.9 INJECTION, SOLUTION INTRAVENOUS at 16:00

## 2022-08-28 RX ADMIN — GABAPENTIN SCH MG: 100 CAPSULE ORAL at 22:41

## 2022-08-28 RX ADMIN — Medication SCH STRIP: at 17:19

## 2022-08-28 RX ADMIN — CARVEDILOL SCH MG: 3.12 TABLET, FILM COATED ORAL at 11:40

## 2022-08-28 RX ADMIN — HUMAN INSULIN SCH UNITS: 100 INJECTION, SOLUTION SUBCUTANEOUS at 23:58

## 2022-08-28 RX ADMIN — FUROSEMIDE SCH MG: 20 TABLET ORAL at 22:27

## 2022-08-28 RX ADMIN — Medication SCH STRIP: at 23:56

## 2022-08-28 RX ADMIN — HUMAN INSULIN SCH UNITS: 100 INJECTION, SOLUTION SUBCUTANEOUS at 16:58

## 2022-08-28 RX ADMIN — POLYVINYL ALCOHOL SCH DROP: 14 SOLUTION/ DROPS OPHTHALMIC at 14:38

## 2022-08-28 RX ADMIN — SODIUM CHLORIDE SCH MLS/HR: 0.9 INJECTION, SOLUTION INTRAVENOUS at 17:20

## 2022-08-28 RX ADMIN — SACUBITRIL AND VALSARTAN SCH TAB: 24; 26 TABLET, FILM COATED ORAL at 10:38

## 2022-08-28 RX ADMIN — PIPERACILLIN SODIUM AND TAZOBACTAM SODIUM SCH MLS/HR: .5; 4 INJECTION, POWDER, LYOPHILIZED, FOR SOLUTION INTRAVENOUS at 05:54

## 2022-08-28 RX ADMIN — PANTOPRAZOLE SODIUM SCH MG: 40 INJECTION, POWDER, FOR SOLUTION INTRAVENOUS at 10:36

## 2022-08-28 RX ADMIN — Medication SCH STRIP: at 11:41

## 2022-08-28 RX ADMIN — POLYVINYL ALCOHOL SCH DROP: 14 SOLUTION/ DROPS OPHTHALMIC at 05:54

## 2022-08-28 RX ADMIN — SACUBITRIL AND VALSARTAN SCH TAB: 24; 26 TABLET, FILM COATED ORAL at 22:27

## 2022-08-28 RX ADMIN — POLYVINYL ALCOHOL SCH DROP: 14 SOLUTION/ DROPS OPHTHALMIC at 17:20

## 2022-08-28 RX ADMIN — GABAPENTIN SCH MG: 100 CAPSULE ORAL at 05:54

## 2022-08-28 RX ADMIN — GABAPENTIN SCH MG: 100 CAPSULE ORAL at 14:38

## 2022-08-28 RX ADMIN — Medication SCH STRIP: at 05:54

## 2022-08-28 RX ADMIN — SPIRONOLACTONE SCH MG: 25 TABLET, FILM COATED ORAL at 10:36

## 2022-08-28 RX ADMIN — ATORVASTATIN CALCIUM SCH MG: 20 TABLET, FILM COATED ORAL at 17:24

## 2022-08-28 RX ADMIN — HUMAN INSULIN SCH UNITS: 100 INJECTION, SOLUTION SUBCUTANEOUS at 12:22

## 2022-08-29 VITALS — DIASTOLIC BLOOD PRESSURE: 75 MMHG | SYSTOLIC BLOOD PRESSURE: 143 MMHG

## 2022-08-29 VITALS — DIASTOLIC BLOOD PRESSURE: 52 MMHG | SYSTOLIC BLOOD PRESSURE: 141 MMHG

## 2022-08-29 VITALS — DIASTOLIC BLOOD PRESSURE: 84 MMHG | SYSTOLIC BLOOD PRESSURE: 135 MMHG

## 2022-08-29 VITALS — DIASTOLIC BLOOD PRESSURE: 71 MMHG | SYSTOLIC BLOOD PRESSURE: 149 MMHG

## 2022-08-29 VITALS — DIASTOLIC BLOOD PRESSURE: 51 MMHG | SYSTOLIC BLOOD PRESSURE: 146 MMHG

## 2022-08-29 LAB — WBC CLUMPS UR QL AUTO: PRESENT /HPF

## 2022-08-29 RX ADMIN — Medication SCH STRIP: at 18:53

## 2022-08-29 RX ADMIN — PIPERACILLIN SODIUM AND TAZOBACTAM SODIUM SCH MLS/HR: .5; 4 INJECTION, POWDER, LYOPHILIZED, FOR SOLUTION INTRAVENOUS at 06:22

## 2022-08-29 RX ADMIN — FUROSEMIDE SCH MG: 20 TABLET ORAL at 08:58

## 2022-08-29 RX ADMIN — LEVOTHYROXINE SODIUM SCH MCG: 50 TABLET ORAL at 08:56

## 2022-08-29 RX ADMIN — ATORVASTATIN CALCIUM SCH MG: 20 TABLET, FILM COATED ORAL at 18:53

## 2022-08-29 RX ADMIN — APIXABAN SCH MG: 5 TABLET, FILM COATED ORAL at 22:35

## 2022-08-29 RX ADMIN — POLYVINYL ALCOHOL SCH DROP: 14 SOLUTION/ DROPS OPHTHALMIC at 13:21

## 2022-08-29 RX ADMIN — GABAPENTIN SCH MG: 100 CAPSULE ORAL at 06:22

## 2022-08-29 RX ADMIN — HUMAN INSULIN SCH UNITS: 100 INJECTION, SOLUTION SUBCUTANEOUS at 13:21

## 2022-08-29 RX ADMIN — Medication SCH STRIP: at 06:22

## 2022-08-29 RX ADMIN — POLYVINYL ALCOHOL SCH DROP: 14 SOLUTION/ DROPS OPHTHALMIC at 22:00

## 2022-08-29 RX ADMIN — CARVEDILOL SCH MG: 3.12 TABLET, FILM COATED ORAL at 08:57

## 2022-08-29 RX ADMIN — PIPERACILLIN SODIUM AND TAZOBACTAM SODIUM SCH MLS/HR: .5; 4 INJECTION, POWDER, LYOPHILIZED, FOR SOLUTION INTRAVENOUS at 13:22

## 2022-08-29 RX ADMIN — SACUBITRIL AND VALSARTAN SCH TAB: 24; 26 TABLET, FILM COATED ORAL at 22:33

## 2022-08-29 RX ADMIN — HUMAN INSULIN SCH UNITS: 100 INJECTION, SOLUTION SUBCUTANEOUS at 18:54

## 2022-08-29 RX ADMIN — SACUBITRIL AND VALSARTAN SCH TAB: 24; 26 TABLET, FILM COATED ORAL at 08:58

## 2022-08-29 RX ADMIN — Medication SCH ML: at 18:53

## 2022-08-29 RX ADMIN — PANTOPRAZOLE SODIUM SCH MG: 40 INJECTION, POWDER, FOR SOLUTION INTRAVENOUS at 08:56

## 2022-08-29 RX ADMIN — Medication SCH STRIP: at 13:20

## 2022-08-29 RX ADMIN — POLYVINYL ALCOHOL SCH DROP: 14 SOLUTION/ DROPS OPHTHALMIC at 06:21

## 2022-08-29 RX ADMIN — SPIRONOLACTONE SCH MG: 25 TABLET, FILM COATED ORAL at 08:57

## 2022-08-29 RX ADMIN — HUMAN INSULIN SCH UNITS: 100 INJECTION, SOLUTION SUBCUTANEOUS at 06:23

## 2022-08-29 RX ADMIN — APIXABAN SCH MG: 5 TABLET, FILM COATED ORAL at 08:58

## 2022-08-29 RX ADMIN — GABAPENTIN SCH MG: 100 CAPSULE ORAL at 22:35

## 2022-08-29 RX ADMIN — FUROSEMIDE SCH MG: 20 TABLET ORAL at 22:35

## 2022-08-29 RX ADMIN — SODIUM CHLORIDE SCH MLS/HR: 9 INJECTION, SOLUTION INTRAVENOUS at 10:38

## 2022-08-29 RX ADMIN — CARVEDILOL SCH MG: 3.12 TABLET, FILM COATED ORAL at 22:34

## 2022-08-29 RX ADMIN — TAMSULOSIN HYDROCHLORIDE SCH MG: 0.4 CAPSULE ORAL at 18:52

## 2022-08-29 RX ADMIN — GABAPENTIN SCH MG: 100 CAPSULE ORAL at 13:22

## 2022-08-30 VITALS — SYSTOLIC BLOOD PRESSURE: 175 MMHG | DIASTOLIC BLOOD PRESSURE: 57 MMHG

## 2022-08-30 VITALS — SYSTOLIC BLOOD PRESSURE: 134 MMHG | DIASTOLIC BLOOD PRESSURE: 56 MMHG

## 2022-08-30 VITALS — DIASTOLIC BLOOD PRESSURE: 56 MMHG | SYSTOLIC BLOOD PRESSURE: 134 MMHG

## 2022-08-30 VITALS — SYSTOLIC BLOOD PRESSURE: 135 MMHG | DIASTOLIC BLOOD PRESSURE: 69 MMHG

## 2022-08-30 VITALS — DIASTOLIC BLOOD PRESSURE: 51 MMHG | SYSTOLIC BLOOD PRESSURE: 151 MMHG

## 2022-08-30 VITALS — DIASTOLIC BLOOD PRESSURE: 68 MMHG | SYSTOLIC BLOOD PRESSURE: 132 MMHG

## 2022-08-30 LAB
ALBUMIN SERPL-MCNC: 1.8 G/DL (ref 3.4–5)
ALP SERPL-CCNC: 66 U/L (ref 45–117)
ALT SERPL-CCNC: 45 U/L (ref 16–61)
ANION GAP SERPL CALCULATED.3IONS-SCNC: 7 MMOL/L (ref 5–15)
BILIRUB SERPL-MCNC: 0.3 MG/DL (ref 0.2–1)
BUN SERPL-MCNC: 13 MG/DL (ref 7–18)
BUN/CREAT SERPL: 13.4
CALCIUM SERPL-MCNC: 7.8 MG/DL (ref 8.5–10.1)
CHLORIDE SERPL-SCNC: 107 MMOL/L (ref 98–107)
CO2 SERPL-SCNC: 23 MMOL/L (ref 21–32)
GLUCOSE SERPL-MCNC: 181 MG/DL (ref 74–106)
POTASSIUM SERPL-SCNC: 3.5 MMOL/L (ref 3.5–5.1)
PROT SERPL-MCNC: 5.5 G/DL (ref 6.4–8.2)
SODIUM SERPL-SCNC: 137 MMOL/L (ref 136–145)

## 2022-08-30 RX ADMIN — LEVOTHYROXINE SODIUM SCH MCG: 50 TABLET ORAL at 09:25

## 2022-08-30 RX ADMIN — HUMAN INSULIN SCH UNITS: 100 INJECTION, SOLUTION SUBCUTANEOUS at 06:14

## 2022-08-30 RX ADMIN — POLYVINYL ALCOHOL SCH DROP: 14 SOLUTION/ DROPS OPHTHALMIC at 12:54

## 2022-08-30 RX ADMIN — Medication SCH STRIP: at 12:53

## 2022-08-30 RX ADMIN — FUROSEMIDE SCH MG: 20 TABLET ORAL at 22:29

## 2022-08-30 RX ADMIN — HUMAN INSULIN SCH UNITS: 100 INJECTION, SOLUTION SUBCUTANEOUS at 12:53

## 2022-08-30 RX ADMIN — PANTOPRAZOLE SODIUM SCH MG: 40 INJECTION, POWDER, FOR SOLUTION INTRAVENOUS at 09:30

## 2022-08-30 RX ADMIN — SACUBITRIL AND VALSARTAN SCH TAB: 24; 26 TABLET, FILM COATED ORAL at 09:34

## 2022-08-30 RX ADMIN — POLYVINYL ALCOHOL SCH DROP: 14 SOLUTION/ DROPS OPHTHALMIC at 22:28

## 2022-08-30 RX ADMIN — HUMAN INSULIN SCH UNITS: 100 INJECTION, SOLUTION SUBCUTANEOUS at 00:26

## 2022-08-30 RX ADMIN — Medication SCH ML: at 09:24

## 2022-08-30 RX ADMIN — SPIRONOLACTONE SCH MG: 25 TABLET, FILM COATED ORAL at 09:33

## 2022-08-30 RX ADMIN — SACUBITRIL AND VALSARTAN SCH TAB: 24; 26 TABLET, FILM COATED ORAL at 22:29

## 2022-08-30 RX ADMIN — GABAPENTIN SCH MG: 100 CAPSULE ORAL at 06:10

## 2022-08-30 RX ADMIN — Medication SCH STRIP: at 00:24

## 2022-08-30 RX ADMIN — GABAPENTIN SCH MG: 100 CAPSULE ORAL at 12:54

## 2022-08-30 RX ADMIN — Medication SCH STRIP: at 18:06

## 2022-08-30 RX ADMIN — POLYVINYL ALCOHOL SCH DROP: 14 SOLUTION/ DROPS OPHTHALMIC at 06:10

## 2022-08-30 RX ADMIN — SODIUM CHLORIDE SCH MLS/HR: 0.9 INJECTION, SOLUTION INTRAVENOUS at 17:36

## 2022-08-30 RX ADMIN — Medication SCH ML: at 12:52

## 2022-08-30 RX ADMIN — FUROSEMIDE SCH MG: 20 TABLET ORAL at 09:34

## 2022-08-30 RX ADMIN — APIXABAN SCH MG: 5 TABLET, FILM COATED ORAL at 10:00

## 2022-08-30 RX ADMIN — CARVEDILOL SCH MG: 3.12 TABLET, FILM COATED ORAL at 09:33

## 2022-08-30 RX ADMIN — Medication SCH ML: at 18:13

## 2022-08-30 RX ADMIN — GABAPENTIN SCH MG: 100 CAPSULE ORAL at 22:30

## 2022-08-30 RX ADMIN — ATORVASTATIN CALCIUM SCH MG: 20 TABLET, FILM COATED ORAL at 18:13

## 2022-08-30 RX ADMIN — CARVEDILOL SCH MG: 3.12 TABLET, FILM COATED ORAL at 22:28

## 2022-08-30 RX ADMIN — SODIUM CHLORIDE SCH MLS/HR: 9 INJECTION, SOLUTION INTRAVENOUS at 09:58

## 2022-08-30 RX ADMIN — Medication SCH STRIP: at 06:10

## 2022-08-30 RX ADMIN — HUMAN INSULIN SCH UNITS: 100 INJECTION, SOLUTION SUBCUTANEOUS at 18:12

## 2022-08-30 RX ADMIN — TAMSULOSIN HYDROCHLORIDE SCH MG: 0.4 CAPSULE ORAL at 18:13

## 2022-08-31 VITALS — DIASTOLIC BLOOD PRESSURE: 69 MMHG | SYSTOLIC BLOOD PRESSURE: 182 MMHG

## 2022-08-31 VITALS — DIASTOLIC BLOOD PRESSURE: 59 MMHG | SYSTOLIC BLOOD PRESSURE: 147 MMHG

## 2022-08-31 VITALS — DIASTOLIC BLOOD PRESSURE: 58 MMHG | SYSTOLIC BLOOD PRESSURE: 179 MMHG

## 2022-08-31 VITALS — SYSTOLIC BLOOD PRESSURE: 149 MMHG | DIASTOLIC BLOOD PRESSURE: 72 MMHG

## 2022-08-31 VITALS — DIASTOLIC BLOOD PRESSURE: 62 MMHG | SYSTOLIC BLOOD PRESSURE: 129 MMHG

## 2022-08-31 RX ADMIN — Medication SCH STRIP: at 00:27

## 2022-08-31 RX ADMIN — Medication SCH ML: at 18:42

## 2022-08-31 RX ADMIN — HUMAN INSULIN SCH UNITS: 100 INJECTION, SOLUTION SUBCUTANEOUS at 13:25

## 2022-08-31 RX ADMIN — LEVOTHYROXINE SODIUM SCH MCG: 50 TABLET ORAL at 08:04

## 2022-08-31 RX ADMIN — ATORVASTATIN CALCIUM SCH MG: 20 TABLET, FILM COATED ORAL at 18:42

## 2022-08-31 RX ADMIN — GABAPENTIN SCH MG: 100 CAPSULE ORAL at 13:12

## 2022-08-31 RX ADMIN — SPIRONOLACTONE SCH MG: 25 TABLET, FILM COATED ORAL at 10:10

## 2022-08-31 RX ADMIN — POLYVINYL ALCOHOL SCH DROP: 14 SOLUTION/ DROPS OPHTHALMIC at 22:29

## 2022-08-31 RX ADMIN — TAMSULOSIN HYDROCHLORIDE SCH MG: 0.4 CAPSULE ORAL at 18:41

## 2022-08-31 RX ADMIN — HUMAN INSULIN SCH UNITS: 100 INJECTION, SOLUTION SUBCUTANEOUS at 18:59

## 2022-08-31 RX ADMIN — POLYVINYL ALCOHOL SCH DROP: 14 SOLUTION/ DROPS OPHTHALMIC at 13:12

## 2022-08-31 RX ADMIN — POLYVINYL ALCOHOL SCH DROP: 14 SOLUTION/ DROPS OPHTHALMIC at 06:27

## 2022-08-31 RX ADMIN — HUMAN INSULIN SCH UNITS: 100 INJECTION, SOLUTION SUBCUTANEOUS at 00:14

## 2022-08-31 RX ADMIN — Medication SCH STRIP: at 13:12

## 2022-08-31 RX ADMIN — Medication SCH ML: at 08:04

## 2022-08-31 RX ADMIN — SODIUM CHLORIDE SCH MLS/HR: 0.9 INJECTION, SOLUTION INTRAVENOUS at 16:23

## 2022-08-31 RX ADMIN — PANTOPRAZOLE SODIUM SCH MG: 40 INJECTION, POWDER, FOR SOLUTION INTRAVENOUS at 10:10

## 2022-08-31 RX ADMIN — Medication SCH STRIP: at 18:42

## 2022-08-31 RX ADMIN — FUROSEMIDE SCH MG: 20 TABLET ORAL at 10:13

## 2022-08-31 RX ADMIN — GABAPENTIN SCH MG: 100 CAPSULE ORAL at 06:28

## 2022-08-31 RX ADMIN — SACUBITRIL AND VALSARTAN SCH TAB: 24; 26 TABLET, FILM COATED ORAL at 10:12

## 2022-08-31 RX ADMIN — GABAPENTIN SCH MG: 100 CAPSULE ORAL at 22:36

## 2022-08-31 RX ADMIN — CARVEDILOL SCH MG: 3.12 TABLET, FILM COATED ORAL at 22:35

## 2022-08-31 RX ADMIN — Medication SCH STRIP: at 06:29

## 2022-08-31 RX ADMIN — CARVEDILOL SCH MG: 3.12 TABLET, FILM COATED ORAL at 10:12

## 2022-08-31 RX ADMIN — Medication SCH ML: at 13:11

## 2022-08-31 RX ADMIN — SODIUM CHLORIDE SCH MLS/HR: 9 INJECTION, SOLUTION INTRAVENOUS at 09:58

## 2022-08-31 RX ADMIN — HUMAN INSULIN SCH UNITS: 100 INJECTION, SOLUTION SUBCUTANEOUS at 06:00

## 2022-08-31 RX ADMIN — FUROSEMIDE SCH MG: 20 TABLET ORAL at 22:36

## 2022-08-31 RX ADMIN — SACUBITRIL AND VALSARTAN SCH TAB: 24; 26 TABLET, FILM COATED ORAL at 22:35

## 2022-09-01 VITALS — DIASTOLIC BLOOD PRESSURE: 64 MMHG | SYSTOLIC BLOOD PRESSURE: 121 MMHG

## 2022-09-01 VITALS — SYSTOLIC BLOOD PRESSURE: 134 MMHG | DIASTOLIC BLOOD PRESSURE: 64 MMHG

## 2022-09-01 RX ADMIN — FUROSEMIDE SCH MG: 20 TABLET ORAL at 10:00

## 2022-09-01 RX ADMIN — HUMAN INSULIN SCH UNITS: 100 INJECTION, SOLUTION SUBCUTANEOUS at 06:00

## 2022-09-01 RX ADMIN — CARVEDILOL SCH MG: 3.12 TABLET, FILM COATED ORAL at 10:00

## 2022-09-01 RX ADMIN — HUMAN INSULIN SCH UNITS: 100 INJECTION, SOLUTION SUBCUTANEOUS at 00:55

## 2022-09-01 RX ADMIN — SACUBITRIL AND VALSARTAN SCH TAB: 24; 26 TABLET, FILM COATED ORAL at 10:00

## 2022-09-01 RX ADMIN — HUMAN INSULIN SCH UNITS: 100 INJECTION, SOLUTION SUBCUTANEOUS at 12:00

## 2022-09-01 RX ADMIN — Medication SCH ML: at 13:19

## 2022-09-01 RX ADMIN — SODIUM CHLORIDE SCH MLS/HR: 9 INJECTION, SOLUTION INTRAVENOUS at 10:00

## 2022-09-01 RX ADMIN — POLYVINYL ALCOHOL SCH DROP: 14 SOLUTION/ DROPS OPHTHALMIC at 06:50

## 2022-09-01 RX ADMIN — LEVOTHYROXINE SODIUM SCH MCG: 50 TABLET ORAL at 08:26

## 2022-09-01 RX ADMIN — Medication SCH STRIP: at 12:00

## 2022-09-01 RX ADMIN — Medication SCH STRIP: at 00:00

## 2022-09-01 RX ADMIN — PANTOPRAZOLE SODIUM SCH MG: 40 INJECTION, POWDER, FOR SOLUTION INTRAVENOUS at 10:00

## 2022-09-01 RX ADMIN — GABAPENTIN SCH MG: 100 CAPSULE ORAL at 06:51

## 2022-09-01 RX ADMIN — Medication SCH STRIP: at 06:24

## 2022-09-01 RX ADMIN — SPIRONOLACTONE SCH MG: 25 TABLET, FILM COATED ORAL at 10:00

## 2022-09-01 RX ADMIN — Medication SCH ML: at 12:00

## 2022-09-06 ENCOUNTER — HOSPITAL ENCOUNTER (INPATIENT)
Dept: HOSPITAL 15 - ER | Age: 87
LOS: 6 days | Discharge: HOME | DRG: 919 | End: 2022-09-12
Attending: INTERNAL MEDICINE
Payer: MEDICARE

## 2022-09-06 VITALS — BODY MASS INDEX: 25.06 KG/M2 | HEIGHT: 70 IN | WEIGHT: 175.05 LBS

## 2022-09-06 DIAGNOSIS — E87.5: ICD-10-CM

## 2022-09-06 DIAGNOSIS — T81.31XA: Primary | ICD-10-CM

## 2022-09-06 DIAGNOSIS — I11.0: ICD-10-CM

## 2022-09-06 DIAGNOSIS — N40.1: ICD-10-CM

## 2022-09-06 DIAGNOSIS — R64: ICD-10-CM

## 2022-09-06 DIAGNOSIS — E11.42: ICD-10-CM

## 2022-09-06 DIAGNOSIS — K80.20: ICD-10-CM

## 2022-09-06 DIAGNOSIS — I97.618: ICD-10-CM

## 2022-09-06 DIAGNOSIS — Z95.0: ICD-10-CM

## 2022-09-06 DIAGNOSIS — Z20.822: ICD-10-CM

## 2022-09-06 DIAGNOSIS — E78.5: ICD-10-CM

## 2022-09-06 DIAGNOSIS — I50.21: ICD-10-CM

## 2022-09-06 DIAGNOSIS — D64.9: ICD-10-CM

## 2022-09-06 DIAGNOSIS — Z79.4: ICD-10-CM

## 2022-09-06 DIAGNOSIS — N35.919: ICD-10-CM

## 2022-09-06 DIAGNOSIS — S42.302A: ICD-10-CM

## 2022-09-06 DIAGNOSIS — E03.9: ICD-10-CM

## 2022-09-06 DIAGNOSIS — N13.8: ICD-10-CM

## 2022-09-06 DIAGNOSIS — Z79.899: ICD-10-CM

## 2022-09-06 DIAGNOSIS — I49.5: ICD-10-CM
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
## 2022-09-06 DIAGNOSIS — G47.33: ICD-10-CM

## 2022-09-06 DIAGNOSIS — I25.5: ICD-10-CM

## 2022-09-06 DIAGNOSIS — N30.91: ICD-10-CM

## 2022-09-06 LAB
ALBUMIN SERPL-MCNC: 2.2 G/DL (ref 3.4–5)
ALP SERPL-CCNC: 79 U/L (ref 45–117)
ALT SERPL-CCNC: 28 U/L (ref 16–61)
ANION GAP SERPL CALCULATED.3IONS-SCNC: 4 MMOL/L (ref 5–15)
BILIRUB SERPL-MCNC: 0.5 MG/DL (ref 0.2–1)
BUN SERPL-MCNC: 15 MG/DL (ref 7–18)
BUN/CREAT SERPL: 16.7
CALCIUM SERPL-MCNC: 8.6 MG/DL (ref 8.5–10.1)
CHLORIDE SERPL-SCNC: 106 MMOL/L (ref 98–107)
CO2 SERPL-SCNC: 27 MMOL/L (ref 21–32)
GLUCOSE SERPL-MCNC: 118 MG/DL (ref 74–106)
HCT VFR BLD AUTO: 35 % (ref 41–53)
HGB BLD-MCNC: 11.4 G/DL (ref 13.5–17.5)
INR PPP: 1.07 (ref 0.9–1.15)
MCH RBC QN AUTO: 28.6 PG (ref 28–32)
MCV RBC AUTO: 87.9 FL (ref 80–100)
NRBC BLD QL AUTO: 0 %
POTASSIUM SERPL-SCNC: 3.7 MMOL/L (ref 3.5–5.1)
PROT SERPL-MCNC: 7.3 G/DL (ref 6.4–8.2)
SODIUM SERPL-SCNC: 137 MMOL/L (ref 136–145)
WBC CLUMPS UR QL AUTO: PRESENT /HPF

## 2022-09-06 PROCEDURE — 82962 GLUCOSE BLOOD TEST: CPT

## 2022-09-06 PROCEDURE — 81001 URINALYSIS AUTO W/SCOPE: CPT

## 2022-09-06 PROCEDURE — 87205 SMEAR GRAM STAIN: CPT

## 2022-09-06 PROCEDURE — 82565 ASSAY OF CREATININE: CPT

## 2022-09-06 PROCEDURE — 87081 CULTURE SCREEN ONLY: CPT

## 2022-09-06 PROCEDURE — 80202 ASSAY OF VANCOMYCIN: CPT

## 2022-09-06 PROCEDURE — 97110 THERAPEUTIC EXERCISES: CPT

## 2022-09-06 PROCEDURE — 97530 THERAPEUTIC ACTIVITIES: CPT

## 2022-09-06 PROCEDURE — 85027 COMPLETE CBC AUTOMATED: CPT

## 2022-09-06 PROCEDURE — 96368 THER/DIAG CONCURRENT INF: CPT

## 2022-09-06 PROCEDURE — 85610 PROTHROMBIN TIME: CPT

## 2022-09-06 PROCEDURE — 97163 PT EVAL HIGH COMPLEX 45 MIN: CPT

## 2022-09-06 PROCEDURE — 85007 BL SMEAR W/DIFF WBC COUNT: CPT

## 2022-09-06 PROCEDURE — 36415 COLL VENOUS BLD VENIPUNCTURE: CPT

## 2022-09-06 PROCEDURE — 97116 GAIT TRAINING THERAPY: CPT

## 2022-09-06 PROCEDURE — 87426 SARSCOV CORONAVIRUS AG IA: CPT

## 2022-09-06 PROCEDURE — 71250 CT THORAX DX C-: CPT

## 2022-09-06 PROCEDURE — 96365 THER/PROPH/DIAG IV INF INIT: CPT

## 2022-09-06 PROCEDURE — 93005 ELECTROCARDIOGRAM TRACING: CPT

## 2022-09-06 PROCEDURE — 85025 COMPLETE CBC W/AUTO DIFF WBC: CPT

## 2022-09-06 PROCEDURE — 80053 COMPREHEN METABOLIC PANEL: CPT

## 2022-09-06 RX ADMIN — Medication SCH STRIP: at 17:00

## 2022-09-06 RX ADMIN — GABAPENTIN SCH MG: 100 CAPSULE ORAL at 22:29

## 2022-09-06 RX ADMIN — Medication SCH STRIP: at 22:20

## 2022-09-06 RX ADMIN — HUMAN INSULIN SCH UNITS: 100 INJECTION, SOLUTION SUBCUTANEOUS at 22:29

## 2022-09-06 RX ADMIN — CARVEDILOL SCH MG: 3.12 TABLET, FILM COATED ORAL at 22:29

## 2022-09-06 RX ADMIN — ALBUTEROL SULFATE SCH TAB: 90 AEROSOL, METERED RESPIRATORY (INHALATION) at 22:00

## 2022-09-06 RX ADMIN — HUMAN INSULIN SCH UNITS: 100 INJECTION, SOLUTION SUBCUTANEOUS at 17:00

## 2022-09-06 RX ADMIN — TAMSULOSIN HYDROCHLORIDE SCH MG: 0.4 CAPSULE ORAL at 17:19

## 2022-09-07 VITALS — SYSTOLIC BLOOD PRESSURE: 132 MMHG | DIASTOLIC BLOOD PRESSURE: 59 MMHG

## 2022-09-07 VITALS — DIASTOLIC BLOOD PRESSURE: 64 MMHG | SYSTOLIC BLOOD PRESSURE: 137 MMHG

## 2022-09-07 LAB
ALBUMIN SERPL-MCNC: 2 G/DL (ref 3.4–5)
ALP SERPL-CCNC: 66 U/L (ref 45–117)
ALT SERPL-CCNC: 23 U/L (ref 16–61)
ANION GAP SERPL CALCULATED.3IONS-SCNC: 6 MMOL/L (ref 5–15)
BILIRUB SERPL-MCNC: 0.4 MG/DL (ref 0.2–1)
BUN SERPL-MCNC: 13 MG/DL (ref 7–18)
BUN/CREAT SERPL: 17.1
CALCIUM SERPL-MCNC: 8.1 MG/DL (ref 8.5–10.1)
CHLORIDE SERPL-SCNC: 106 MMOL/L (ref 98–107)
CO2 SERPL-SCNC: 27 MMOL/L (ref 21–32)
GLUCOSE SERPL-MCNC: 97 MG/DL (ref 74–106)
HCT VFR BLD AUTO: 33.1 % (ref 41–53)
HGB BLD-MCNC: 10.9 G/DL (ref 13.5–17.5)
MCH RBC QN AUTO: 29.3 PG (ref 28–32)
MCV RBC AUTO: 88.8 FL (ref 80–100)
NRBC BLD QL AUTO: 0.1 %
POTASSIUM SERPL-SCNC: 3.9 MMOL/L (ref 3.5–5.1)
PROT SERPL-MCNC: 6.5 G/DL (ref 6.4–8.2)
SODIUM SERPL-SCNC: 139 MMOL/L (ref 136–145)

## 2022-09-07 RX ADMIN — FUROSEMIDE SCH MG: 40 TABLET ORAL at 11:11

## 2022-09-07 RX ADMIN — CEFTRIAXONE SODIUM SCH MLS/HR: 1 INJECTION, POWDER, FOR SOLUTION INTRAMUSCULAR; INTRAVENOUS at 09:40

## 2022-09-07 RX ADMIN — HUMAN INSULIN SCH UNITS: 100 INJECTION, SOLUTION SUBCUTANEOUS at 17:00

## 2022-09-07 RX ADMIN — HUMAN INSULIN SCH UNITS: 100 INJECTION, SOLUTION SUBCUTANEOUS at 12:18

## 2022-09-07 RX ADMIN — HUMAN INSULIN SCH UNITS: 100 INJECTION, SOLUTION SUBCUTANEOUS at 21:23

## 2022-09-07 RX ADMIN — GABAPENTIN SCH MG: 100 CAPSULE ORAL at 14:14

## 2022-09-07 RX ADMIN — ALBUTEROL SULFATE SCH TAB: 90 AEROSOL, METERED RESPIRATORY (INHALATION) at 21:27

## 2022-09-07 RX ADMIN — GABAPENTIN SCH MG: 100 CAPSULE ORAL at 06:12

## 2022-09-07 RX ADMIN — CARVEDILOL SCH MG: 3.12 TABLET, FILM COATED ORAL at 11:12

## 2022-09-07 RX ADMIN — ALBUTEROL SULFATE SCH TAB: 90 AEROSOL, METERED RESPIRATORY (INHALATION) at 10:00

## 2022-09-07 RX ADMIN — VANCOMYCIN HYDROCHLORIDE SCH MLS/HR: 1 INJECTION, POWDER, LYOPHILIZED, FOR SOLUTION INTRAVENOUS at 11:13

## 2022-09-07 RX ADMIN — TAMSULOSIN HYDROCHLORIDE SCH MG: 0.4 CAPSULE ORAL at 17:59

## 2022-09-07 RX ADMIN — HUMAN INSULIN SCH UNITS: 100 INJECTION, SOLUTION SUBCUTANEOUS at 06:44

## 2022-09-07 RX ADMIN — Medication SCH STRIP: at 12:18

## 2022-09-07 RX ADMIN — CARVEDILOL SCH MG: 3.12 TABLET, FILM COATED ORAL at 21:33

## 2022-09-07 RX ADMIN — Medication SCH STRIP: at 17:09

## 2022-09-07 RX ADMIN — INSULIN GLARGINE SCH UNITS: 100 INJECTION, SOLUTION SUBCUTANEOUS at 06:46

## 2022-09-07 RX ADMIN — PANTOPRAZOLE SODIUM SCH MG: 40 INJECTION, POWDER, FOR SOLUTION INTRAVENOUS at 11:11

## 2022-09-07 RX ADMIN — LEVOTHYROXINE SODIUM SCH MCG: 50 TABLET ORAL at 06:51

## 2022-09-07 RX ADMIN — Medication SCH STRIP: at 21:20

## 2022-09-07 RX ADMIN — CARVEDILOL SCH MG: 3.12 TABLET, FILM COATED ORAL at 22:33

## 2022-09-07 RX ADMIN — GABAPENTIN SCH MG: 100 CAPSULE ORAL at 21:32

## 2022-09-07 RX ADMIN — Medication SCH STRIP: at 06:44

## 2022-09-08 VITALS — DIASTOLIC BLOOD PRESSURE: 53 MMHG | SYSTOLIC BLOOD PRESSURE: 115 MMHG

## 2022-09-08 VITALS — DIASTOLIC BLOOD PRESSURE: 54 MMHG | SYSTOLIC BLOOD PRESSURE: 121 MMHG

## 2022-09-08 VITALS — DIASTOLIC BLOOD PRESSURE: 50 MMHG | SYSTOLIC BLOOD PRESSURE: 124 MMHG

## 2022-09-08 VITALS — DIASTOLIC BLOOD PRESSURE: 59 MMHG | SYSTOLIC BLOOD PRESSURE: 138 MMHG

## 2022-09-08 VITALS — SYSTOLIC BLOOD PRESSURE: 130 MMHG | DIASTOLIC BLOOD PRESSURE: 46 MMHG

## 2022-09-08 RX ADMIN — VANCOMYCIN HYDROCHLORIDE SCH MLS/HR: 1 INJECTION, POWDER, LYOPHILIZED, FOR SOLUTION INTRAVENOUS at 04:39

## 2022-09-08 RX ADMIN — HUMAN INSULIN SCH UNITS: 100 INJECTION, SOLUTION SUBCUTANEOUS at 06:37

## 2022-09-08 RX ADMIN — GABAPENTIN SCH MG: 100 CAPSULE ORAL at 05:30

## 2022-09-08 RX ADMIN — GABAPENTIN SCH MG: 100 CAPSULE ORAL at 13:07

## 2022-09-08 RX ADMIN — LEVOTHYROXINE SODIUM SCH MCG: 50 TABLET ORAL at 06:01

## 2022-09-08 RX ADMIN — Medication SCH STRIP: at 11:30

## 2022-09-08 RX ADMIN — PANTOPRAZOLE SODIUM SCH MG: 40 INJECTION, POWDER, FOR SOLUTION INTRAVENOUS at 10:21

## 2022-09-08 RX ADMIN — ALBUTEROL SULFATE SCH TAB: 90 AEROSOL, METERED RESPIRATORY (INHALATION) at 22:00

## 2022-09-08 RX ADMIN — TAMSULOSIN HYDROCHLORIDE SCH MG: 0.4 CAPSULE ORAL at 17:58

## 2022-09-08 RX ADMIN — HUMAN INSULIN SCH UNITS: 100 INJECTION, SOLUTION SUBCUTANEOUS at 23:05

## 2022-09-08 RX ADMIN — VANCOMYCIN HYDROCHLORIDE SCH MLS/HR: 1 INJECTION, POWDER, LYOPHILIZED, FOR SOLUTION INTRAVENOUS at 23:00

## 2022-09-08 RX ADMIN — HUMAN INSULIN SCH UNITS: 100 INJECTION, SOLUTION SUBCUTANEOUS at 12:30

## 2022-09-08 RX ADMIN — GABAPENTIN SCH MG: 100 CAPSULE ORAL at 22:53

## 2022-09-08 RX ADMIN — CEFTRIAXONE SODIUM SCH MLS/HR: 1 INJECTION, POWDER, FOR SOLUTION INTRAMUSCULAR; INTRAVENOUS at 08:45

## 2022-09-08 RX ADMIN — Medication SCH STRIP: at 17:00

## 2022-09-08 RX ADMIN — ALBUTEROL SULFATE SCH TAB: 90 AEROSOL, METERED RESPIRATORY (INHALATION) at 10:00

## 2022-09-08 RX ADMIN — Medication SCH STRIP: at 06:02

## 2022-09-08 RX ADMIN — INSULIN GLARGINE SCH UNITS: 100 INJECTION, SOLUTION SUBCUTANEOUS at 06:36

## 2022-09-08 RX ADMIN — CARVEDILOL SCH MG: 3.12 TABLET, FILM COATED ORAL at 10:22

## 2022-09-08 RX ADMIN — HUMAN INSULIN SCH UNITS: 100 INJECTION, SOLUTION SUBCUTANEOUS at 17:00

## 2022-09-08 RX ADMIN — FUROSEMIDE SCH MG: 40 TABLET ORAL at 10:21

## 2022-09-08 RX ADMIN — CARVEDILOL SCH MG: 3.12 TABLET, FILM COATED ORAL at 22:54

## 2022-09-08 RX ADMIN — Medication SCH STRIP: at 23:02

## 2022-09-09 VITALS — DIASTOLIC BLOOD PRESSURE: 67 MMHG | SYSTOLIC BLOOD PRESSURE: 177 MMHG

## 2022-09-09 VITALS — SYSTOLIC BLOOD PRESSURE: 118 MMHG | DIASTOLIC BLOOD PRESSURE: 61 MMHG

## 2022-09-09 VITALS — DIASTOLIC BLOOD PRESSURE: 24 MMHG | SYSTOLIC BLOOD PRESSURE: 118 MMHG

## 2022-09-09 VITALS — SYSTOLIC BLOOD PRESSURE: 148 MMHG | DIASTOLIC BLOOD PRESSURE: 52 MMHG

## 2022-09-09 VITALS — SYSTOLIC BLOOD PRESSURE: 134 MMHG | DIASTOLIC BLOOD PRESSURE: 29 MMHG

## 2022-09-09 RX ADMIN — LEVOTHYROXINE SODIUM SCH MCG: 50 TABLET ORAL at 06:51

## 2022-09-09 RX ADMIN — CARVEDILOL SCH MG: 3.12 TABLET, FILM COATED ORAL at 22:06

## 2022-09-09 RX ADMIN — CARVEDILOL SCH MG: 3.12 TABLET, FILM COATED ORAL at 09:59

## 2022-09-09 RX ADMIN — ALBUTEROL SULFATE SCH TAB: 90 AEROSOL, METERED RESPIRATORY (INHALATION) at 22:03

## 2022-09-09 RX ADMIN — INSULIN GLARGINE SCH UNITS: 100 INJECTION, SOLUTION SUBCUTANEOUS at 07:01

## 2022-09-09 RX ADMIN — PANTOPRAZOLE SODIUM SCH MG: 40 INJECTION, POWDER, FOR SOLUTION INTRAVENOUS at 09:58

## 2022-09-09 RX ADMIN — CEFTRIAXONE SODIUM SCH MLS/HR: 1 INJECTION, POWDER, FOR SOLUTION INTRAMUSCULAR; INTRAVENOUS at 08:20

## 2022-09-09 RX ADMIN — HUMAN INSULIN SCH UNITS: 100 INJECTION, SOLUTION SUBCUTANEOUS at 22:07

## 2022-09-09 RX ADMIN — GABAPENTIN SCH MG: 100 CAPSULE ORAL at 22:05

## 2022-09-09 RX ADMIN — Medication SCH STRIP: at 17:00

## 2022-09-09 RX ADMIN — HUMAN INSULIN SCH UNITS: 100 INJECTION, SOLUTION SUBCUTANEOUS at 12:30

## 2022-09-09 RX ADMIN — HUMAN INSULIN SCH UNITS: 100 INJECTION, SOLUTION SUBCUTANEOUS at 06:59

## 2022-09-09 RX ADMIN — FUROSEMIDE SCH MG: 40 TABLET ORAL at 09:59

## 2022-09-09 RX ADMIN — GABAPENTIN SCH MG: 100 CAPSULE ORAL at 12:44

## 2022-09-09 RX ADMIN — ALBUTEROL SULFATE SCH TAB: 90 AEROSOL, METERED RESPIRATORY (INHALATION) at 09:59

## 2022-09-09 RX ADMIN — HUMAN INSULIN SCH UNITS: 100 INJECTION, SOLUTION SUBCUTANEOUS at 18:31

## 2022-09-09 RX ADMIN — GABAPENTIN SCH MG: 100 CAPSULE ORAL at 06:00

## 2022-09-09 RX ADMIN — Medication SCH STRIP: at 11:30

## 2022-09-09 RX ADMIN — Medication SCH STRIP: at 07:00

## 2022-09-09 RX ADMIN — TAMSULOSIN HYDROCHLORIDE SCH MG: 0.4 CAPSULE ORAL at 18:35

## 2022-09-09 RX ADMIN — Medication SCH STRIP: at 22:03

## 2022-09-10 VITALS — SYSTOLIC BLOOD PRESSURE: 114 MMHG | DIASTOLIC BLOOD PRESSURE: 72 MMHG

## 2022-09-10 VITALS — DIASTOLIC BLOOD PRESSURE: 53 MMHG | SYSTOLIC BLOOD PRESSURE: 128 MMHG

## 2022-09-10 VITALS — DIASTOLIC BLOOD PRESSURE: 71 MMHG | SYSTOLIC BLOOD PRESSURE: 147 MMHG

## 2022-09-10 VITALS — SYSTOLIC BLOOD PRESSURE: 139 MMHG | DIASTOLIC BLOOD PRESSURE: 64 MMHG

## 2022-09-10 RX ADMIN — PANTOPRAZOLE SODIUM SCH MG: 40 INJECTION, POWDER, FOR SOLUTION INTRAVENOUS at 09:57

## 2022-09-10 RX ADMIN — Medication SCH STRIP: at 21:55

## 2022-09-10 RX ADMIN — Medication SCH STRIP: at 17:00

## 2022-09-10 RX ADMIN — FUROSEMIDE SCH MG: 40 TABLET ORAL at 09:58

## 2022-09-10 RX ADMIN — Medication SCH STRIP: at 07:00

## 2022-09-10 RX ADMIN — CARVEDILOL SCH MG: 3.12 TABLET, FILM COATED ORAL at 09:57

## 2022-09-10 RX ADMIN — TAMSULOSIN HYDROCHLORIDE SCH MG: 0.4 CAPSULE ORAL at 18:34

## 2022-09-10 RX ADMIN — GABAPENTIN SCH MG: 100 CAPSULE ORAL at 06:00

## 2022-09-10 RX ADMIN — HUMAN INSULIN SCH UNITS: 100 INJECTION, SOLUTION SUBCUTANEOUS at 17:00

## 2022-09-10 RX ADMIN — INSULIN GLARGINE SCH UNITS: 100 INJECTION, SOLUTION SUBCUTANEOUS at 06:31

## 2022-09-10 RX ADMIN — HUMAN INSULIN SCH UNITS: 100 INJECTION, SOLUTION SUBCUTANEOUS at 22:05

## 2022-09-10 RX ADMIN — ALBUTEROL SULFATE SCH TAB: 90 AEROSOL, METERED RESPIRATORY (INHALATION) at 09:57

## 2022-09-10 RX ADMIN — CARVEDILOL SCH MG: 3.12 TABLET, FILM COATED ORAL at 21:55

## 2022-09-10 RX ADMIN — GABAPENTIN SCH MG: 100 CAPSULE ORAL at 13:05

## 2022-09-10 RX ADMIN — ALBUTEROL SULFATE SCH TAB: 90 AEROSOL, METERED RESPIRATORY (INHALATION) at 21:55

## 2022-09-10 RX ADMIN — GABAPENTIN SCH MG: 100 CAPSULE ORAL at 21:55

## 2022-09-10 RX ADMIN — HUMAN INSULIN SCH UNITS: 100 INJECTION, SOLUTION SUBCUTANEOUS at 12:59

## 2022-09-10 RX ADMIN — CEFTRIAXONE SODIUM SCH MLS/HR: 1 INJECTION, POWDER, FOR SOLUTION INTRAMUSCULAR; INTRAVENOUS at 09:40

## 2022-09-10 RX ADMIN — HUMAN INSULIN SCH UNITS: 100 INJECTION, SOLUTION SUBCUTANEOUS at 06:30

## 2022-09-10 RX ADMIN — LEVOTHYROXINE SODIUM SCH MCG: 50 TABLET ORAL at 06:00

## 2022-09-10 RX ADMIN — Medication SCH STRIP: at 11:30

## 2022-09-11 VITALS — DIASTOLIC BLOOD PRESSURE: 50 MMHG | SYSTOLIC BLOOD PRESSURE: 134 MMHG

## 2022-09-11 VITALS — DIASTOLIC BLOOD PRESSURE: 48 MMHG | SYSTOLIC BLOOD PRESSURE: 151 MMHG

## 2022-09-11 VITALS — DIASTOLIC BLOOD PRESSURE: 50 MMHG | SYSTOLIC BLOOD PRESSURE: 133 MMHG

## 2022-09-11 VITALS — DIASTOLIC BLOOD PRESSURE: 50 MMHG | SYSTOLIC BLOOD PRESSURE: 124 MMHG

## 2022-09-11 VITALS — SYSTOLIC BLOOD PRESSURE: 121 MMHG | DIASTOLIC BLOOD PRESSURE: 56 MMHG

## 2022-09-11 LAB
HCT VFR BLD AUTO: 31.9 % (ref 41–53)
HGB BLD-MCNC: 10.5 G/DL (ref 13.5–17.5)
MCH RBC QN AUTO: 29.5 PG (ref 28–32)
MCV RBC AUTO: 89.6 FL (ref 80–100)

## 2022-09-11 RX ADMIN — HUMAN INSULIN SCH UNITS: 100 INJECTION, SOLUTION SUBCUTANEOUS at 11:30

## 2022-09-11 RX ADMIN — DEXTROSE MONOHYDRATE PRN ML: 500 INJECTION PARENTERAL at 01:41

## 2022-09-11 RX ADMIN — Medication SCH STRIP: at 12:00

## 2022-09-11 RX ADMIN — TAMSULOSIN HYDROCHLORIDE SCH MG: 0.4 CAPSULE ORAL at 18:55

## 2022-09-11 RX ADMIN — Medication SCH STRIP: at 06:26

## 2022-09-11 RX ADMIN — HUMAN INSULIN SCH UNITS: 100 INJECTION, SOLUTION SUBCUTANEOUS at 22:12

## 2022-09-11 RX ADMIN — GABAPENTIN SCH MG: 100 CAPSULE ORAL at 06:00

## 2022-09-11 RX ADMIN — INSULIN GLARGINE SCH UNITS: 100 INJECTION, SOLUTION SUBCUTANEOUS at 06:46

## 2022-09-11 RX ADMIN — HUMAN INSULIN SCH UNITS: 100 INJECTION, SOLUTION SUBCUTANEOUS at 06:25

## 2022-09-11 RX ADMIN — HUMAN INSULIN SCH UNITS: 100 INJECTION, SOLUTION SUBCUTANEOUS at 16:54

## 2022-09-11 RX ADMIN — PANTOPRAZOLE SODIUM SCH MG: 40 INJECTION, POWDER, FOR SOLUTION INTRAVENOUS at 09:46

## 2022-09-11 RX ADMIN — GABAPENTIN SCH MG: 100 CAPSULE ORAL at 14:46

## 2022-09-11 RX ADMIN — ALBUTEROL SULFATE SCH TAB: 90 AEROSOL, METERED RESPIRATORY (INHALATION) at 21:34

## 2022-09-11 RX ADMIN — Medication SCH STRIP: at 21:34

## 2022-09-11 RX ADMIN — CARVEDILOL SCH MG: 3.12 TABLET, FILM COATED ORAL at 09:48

## 2022-09-11 RX ADMIN — PHENAZOPYRIDINE SCH MG: 100 TABLET ORAL at 21:34

## 2022-09-11 RX ADMIN — CARVEDILOL SCH MG: 3.12 TABLET, FILM COATED ORAL at 21:49

## 2022-09-11 RX ADMIN — Medication SCH STRIP: at 16:54

## 2022-09-11 RX ADMIN — ALBUTEROL SULFATE SCH TAB: 90 AEROSOL, METERED RESPIRATORY (INHALATION) at 09:53

## 2022-09-11 RX ADMIN — FUROSEMIDE SCH MG: 40 TABLET ORAL at 09:47

## 2022-09-11 RX ADMIN — GABAPENTIN SCH MG: 100 CAPSULE ORAL at 21:32

## 2022-09-11 RX ADMIN — LEVOTHYROXINE SODIUM SCH MCG: 50 TABLET ORAL at 06:25

## 2022-09-12 VITALS — SYSTOLIC BLOOD PRESSURE: 126 MMHG | DIASTOLIC BLOOD PRESSURE: 53 MMHG

## 2022-09-12 VITALS — DIASTOLIC BLOOD PRESSURE: 60 MMHG | SYSTOLIC BLOOD PRESSURE: 107 MMHG

## 2022-09-12 RX ADMIN — HUMAN INSULIN SCH UNITS: 100 INJECTION, SOLUTION SUBCUTANEOUS at 13:03

## 2022-09-12 RX ADMIN — PHENAZOPYRIDINE SCH MG: 100 TABLET ORAL at 10:45

## 2022-09-12 RX ADMIN — FUROSEMIDE SCH MG: 40 TABLET ORAL at 10:45

## 2022-09-12 RX ADMIN — Medication SCH STRIP: at 06:39

## 2022-09-12 RX ADMIN — CARVEDILOL SCH MG: 3.12 TABLET, FILM COATED ORAL at 10:44

## 2022-09-12 RX ADMIN — DEXTROSE MONOHYDRATE PRN ML: 500 INJECTION PARENTERAL at 01:50

## 2022-09-12 RX ADMIN — ALBUTEROL SULFATE SCH TAB: 90 AEROSOL, METERED RESPIRATORY (INHALATION) at 10:44

## 2022-09-12 RX ADMIN — GABAPENTIN SCH MG: 100 CAPSULE ORAL at 06:00

## 2022-09-12 RX ADMIN — LEVOTHYROXINE SODIUM SCH MCG: 50 TABLET ORAL at 06:41

## 2022-09-12 RX ADMIN — HUMAN INSULIN SCH UNITS: 100 INJECTION, SOLUTION SUBCUTANEOUS at 06:42

## 2022-09-12 RX ADMIN — INSULIN GLARGINE SCH UNITS: 100 INJECTION, SOLUTION SUBCUTANEOUS at 06:42

## 2022-09-12 RX ADMIN — Medication SCH STRIP: at 12:55

## 2022-09-12 RX ADMIN — PANTOPRAZOLE SODIUM SCH MG: 40 INJECTION, POWDER, FOR SOLUTION INTRAVENOUS at 10:44

## 2023-11-28 NOTE — NUR
Chronic, controlled  Not on anti-hypertensives  - monitor   Dr. Shay does not agree for pt to use home insulin program.  Pt's family informed.

## 2024-02-13 NOTE — NUR
Low Víctor of 11, skin intact per physical assessment. Pt with A1c 7.6 recently admitted, pt 
and SO seen by DRISS 9/17/19 and provided with written and verbal DM education with referral to 
outpatient DM class and RD contact information. No further education warranted at this time. 
Will continue to follow.

-------------------------------------------------------------------------------

Addendum: 09/28/19 at 1049 by Falguni Moran RD

-------------------------------------------------------------------------------

Amended: Links added. Statement Selected